# Patient Record
Sex: FEMALE | Race: WHITE | Employment: OTHER | URBAN - METROPOLITAN AREA
[De-identification: names, ages, dates, MRNs, and addresses within clinical notes are randomized per-mention and may not be internally consistent; named-entity substitution may affect disease eponyms.]

---

## 2017-05-09 RX ORDER — TOPIRAMATE 25 MG/1
TABLET ORAL
Qty: 270 TABLET | Refills: 0 | Status: SHIPPED | OUTPATIENT
Start: 2017-05-09 | End: 2017-06-19 | Stop reason: SDUPTHER

## 2017-06-19 ENCOUNTER — OFFICE VISIT (OUTPATIENT)
Dept: NEUROLOGY | Age: 62
End: 2017-06-19
Payer: COMMERCIAL

## 2017-06-19 VITALS
SYSTOLIC BLOOD PRESSURE: 119 MMHG | HEART RATE: 57 BPM | WEIGHT: 138 LBS | DIASTOLIC BLOOD PRESSURE: 70 MMHG | BODY MASS INDEX: 24.45 KG/M2 | HEIGHT: 63 IN

## 2017-06-19 DIAGNOSIS — G43.109 BASILAR MIGRAINE: Primary | ICD-10-CM

## 2017-06-19 PROCEDURE — 99214 OFFICE O/P EST MOD 30 MIN: CPT | Performed by: PSYCHIATRY & NEUROLOGY

## 2017-06-19 RX ORDER — TOPIRAMATE 50 MG/1
50 TABLET, FILM COATED ORAL 2 TIMES DAILY
Qty: 180 TABLET | Refills: 2 | Status: SHIPPED | OUTPATIENT
Start: 2017-06-19 | End: 2018-02-12 | Stop reason: SDUPTHER

## 2017-11-14 ENCOUNTER — OFFICE VISIT (OUTPATIENT)
Dept: NEUROLOGY | Age: 62
End: 2017-11-14
Payer: COMMERCIAL

## 2017-11-14 VITALS
HEIGHT: 63 IN | BODY MASS INDEX: 24.56 KG/M2 | HEART RATE: 73 BPM | DIASTOLIC BLOOD PRESSURE: 60 MMHG | WEIGHT: 138.6 LBS | SYSTOLIC BLOOD PRESSURE: 101 MMHG

## 2017-11-14 DIAGNOSIS — G43.109 BASILAR MIGRAINE: Primary | ICD-10-CM

## 2017-11-14 PROCEDURE — 99213 OFFICE O/P EST LOW 20 MIN: CPT | Performed by: NURSE PRACTITIONER

## 2017-11-14 NOTE — PROGRESS NOTES
[facility]  [facility], [address], [address] [place] [address]  Dept: [phone]  Dept Fax: [phone]                            MD Deja Morin MD Gwendolyn Ferretti, MD Ahmed B. Glennie Main, MD Solon Person, MD Lafayette Little, IVONNE      11/14/2017    HPI:      Your patient, Poly Peterson returns for continuing neurologic care. Patient is a 77-year-old woman seen in the past by [facility] for basilar migraines. Patient has been titrated to Topamax 50 mg twice daily and has done well. She has not been getting any significant headaches however lately she is having increased basilar symptoms. She gets a vertiginous feeling approximately 2-3 times per week. She has been under a significant amount of stress. Her  had a stroke earlier this year as well as an electrical house fire. 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     Past Medical History:   Diagnosis Date    Frozen shoulder     left    Insomnia     Migraine     Osteoarthritis     Osteopenia     Tension headache     Ulcerative colitis ([facility] [address]          Past Surgical History:   Procedure Laterality Date    TONSILLECTOMY      TUBAL LIGATION         Family History   Problem Relation Age of Onset    Cancer Mother      liver, colon    Diabetes Father     High Blood Pressure Father     Neuropathy Father     Heart Disease Father      CHF       Social History   Substance Use Topics    Smoking status: Never Smoker    Smokeless tobacco: Never Used    Alcohol use 0.0 oz/week      Comment: occasionally                               REVIEW OF SYSTEMS    CONSTITUTIONAL Weight: absent, Appetite: absent, Fatigue: absent      HEENT Ears: normal, Visual disturbance: absent   RESPIRATORY Shortness of breath: absent, Cough: absent   CARDIOVASCULAR Chest pain: absent, Leg swelling :absent      GI Constipation: absent, Diarrhea: absent, Swallowing change: absent       Urinary frequency: absent, Urinary urgency: absent, Urinary incontinence: absent   MUSCULOSKELETAL Neck pain: absent, Back pain: absent, Stiffness: absent, Muscle pain: absent, Joint pain: absent Restless legs: absent   DERMATOLOGIC Hair loss: absent, Skin changes: absent   NEUROLOGIC Memory loss: absent, Confusion: absent, Seizures: absent Trouble walking or imbalance: absent, Dizziness: present, Weakness: absent, Numbness: absent Tremor: absent, Spasm: absent, Speech difficulty: absent, Headache: absent, Light sensitivity: absent   PSYCHIATRIC Anxiety: present, Hallucination: absent, Mood disorder: absent   HEMATOLOGIC Abnormal bleeding: absent, Anemia: absent, Clotting disorder: absent, Lymph gland changes: absent           Allergies   Allergen Reactions    Penicillins     Sulfa Antibiotics            Current Outpatient Prescriptions   Medication Sig Dispense Refill    topiramate (TOPAMAX) 50 MG tablet Take 1 tablet by mouth 2 times daily 180 tablet 2    SUMAtriptan (IMITREX) 25 MG tablet Take 1 tab at headache onset, may repeat in 2 hours PRN. No more than 2 tabs/day, 4 tabs/week. 9 tablet 3    butalbital-aspirin-caffeine-codeine (FIORINAL/CODEINE #3) -61-30 MG capsule Take 1 capsule by mouth daily as needed for Migraine 30 capsule 0    Multiple Vitamins-Minerals (MULTIVITAMIN PO) Take by mouth daily      calcium carbonate-vitamin D (CALCIUM 600+D) 600-200 MG-UNIT TABS Take by mouth 2 times daily      aspirin 81 MG EC tablet Take 81 mg by mouth daily.  ibuprofen (ADVIL;MOTRIN) 200 MG CAPS Take 1 capsule by mouth as needed. No current facility-administered medications for this visit. PHYSICAL EXAMINATION       There were no vitals filed for this visit. Veena Denise General Appearance:  Alert, cooperative, no signs of distress, appears stated age   Head:  Normocephalic, no signs of trauma   Eyes:  Conjunctiva/corneas clear;  eyelids intact   Ears:  Normal external ear and canals   Nose: Nares normal, mucosa normal, no drainage    Throat: Lips and tongue normal; teeth normal;  gums normal   Neck: Supple, intact flexion, extension and rotation;   trachea midline;  no adenopathy;   thyroid: not enlarged;   no carotid pulse abnormality   Back:   Symmetric, no curvature, ROM adequate   Lungs:   Respirations unlabored   Heart:  Regular rate and rhythm           Extremities: Extremities normal, no cyanosis, no edema   Pulses: Symmetric over head and neck   Skin: Skin color, texture normal, no rashes, no lesions                                             NEUROLOGIC EXAMINATION    Neurologic Exam     Mental Status   Oriented to person, place, and time. Attention: normal.   Speech: speech is normal   Level of consciousness: alert  Normal comprehension. Cranial Nerves     CN II   Visual fields full to confrontation. CN III, IV, VI   Pupils are equal, round, and reactive to light. Extraocular motions are normal.     CN V   Facial sensation intact. CN VII   Facial expression full, symmetric. CN VIII   CN VIII normal.     CN IX, X   CN IX normal.     CN XI   CN XI normal.     CN XII   CN XII normal.     Motor Exam   Muscle bulk: normal  Overall muscle tone: normal  Right arm pronator drift: absent    Strength   Strength 5/5 throughout.      Sensory Exam   Light touch normal.   Vibration normal.   Pinprick normal.     Gait, Coordination, and Reflexes     Gait  Gait: normal    Coordination   Finger to nose coordination: normal    Tremor   Resting tremor: absent    Reflexes   Right brachioradialis: 2+  Left brachioradialis: 2+  Right biceps: 2+  Left biceps: 2+  Right triceps: 2+  Left triceps: 2+  Right patellar: 2+  Left patellar: 2+  Right achilles: 2+  Left achilles: 2+  Right plantar: normal  Left plantar: normal            ASSESSMENT/PLAN:       In summary, your patient, Alejandro Long exhibits the following, with associated plan:    1. Basilar migraines, have been exacerbated recently with psychological stress. 1. Patient will increase her bedtime Topamax to 75 mg and continue 50 mg in the morning until her stressful job situation subsides. If at that time she is not having any symptoms, she may titrate down to 50 mg twice daily and assess her response to the decreased dosage. 2. We also discussed options for anxiety, which patient will think about, understanding that she would be placed on a long-term medication to prevent anxiety.   3. She will return in follow-up in 6 months            Signed: Jayy Curry CNP

## 2017-11-14 NOTE — LETTER
Comment: occasionally                               REVIEW OF SYSTEMS    CONSTITUTIONAL Weight: absent, Appetite: absent, Fatigue: absent      HEENT Ears: normal, Visual disturbance: absent   RESPIRATORY Shortness of breath: absent, Cough: absent   CARDIOVASCULAR Chest pain: absent, Leg swelling :absent      GI Constipation: absent, Diarrhea: absent, Swallowing change: absent       Urinary frequency: absent, Urinary urgency: absent, Urinary incontinence: absent   MUSCULOSKELETAL Neck pain: absent, Back pain: absent, Stiffness: absent, Muscle pain: absent, Joint pain: absent Restless legs: absent   DERMATOLOGIC Hair loss: absent, Skin changes: absent   NEUROLOGIC Memory loss: absent, Confusion: absent, Seizures: absent Trouble walking or imbalance: absent, Dizziness: present, Weakness: absent, Numbness: absent Tremor: absent, Spasm: absent, Speech difficulty: absent, Headache: absent, Light sensitivity: absent   PSYCHIATRIC Anxiety: present, Hallucination: absent, Mood disorder: absent   HEMATOLOGIC Abnormal bleeding: absent, Anemia: absent, Clotting disorder: absent, Lymph gland changes: absent           Allergies   Allergen Reactions    Penicillins     Sulfa Antibiotics            Current Outpatient Prescriptions   Medication Sig Dispense Refill    topiramate (TOPAMAX) 50 MG tablet Take 1 tablet by mouth 2 times daily 180 tablet 2    SUMAtriptan (IMITREX) 25 MG tablet Take 1 tab at headache onset, may repeat in 2 hours PRN. No more than 2 tabs/day, 4 tabs/week. 9 tablet 3    butalbital-aspirin-caffeine-codeine (FIORINAL/CODEINE #3) -76-30 MG capsule Take 1 capsule by mouth daily as needed for Migraine 30 capsule 0    Multiple Vitamins-Minerals (MULTIVITAMIN PO) Take by mouth daily      calcium carbonate-vitamin D (CALCIUM 600+D) 600-200 MG-UNIT TABS Take by mouth 2 times daily      aspirin 81 MG EC tablet Take 81 mg by mouth daily.  ibuprofen (ADVIL;MOTRIN) 200 MG CAPS Take 1 capsule by mouth as needed. No current facility-administered medications for this visit. PHYSICAL EXAMINATION       There were no vitals filed for this visit. .                                                                                                    General Appearance:  Alert, cooperative, no signs of distress, appears stated age   Head:  Normocephalic, no signs of trauma   Eyes:  Conjunctiva/corneas clear;  eyelids intact   Ears:  Normal external ear and canals   Nose: Nares normal, mucosa normal, no drainage    Throat: Lips and tongue normal; teeth normal;  gums normal   Neck: Supple, intact flexion, extension and rotation;   trachea midline;  no adenopathy;   thyroid: not enlarged;   no carotid pulse abnormality   Back:   Symmetric, no curvature, ROM adequate   Lungs:   Respirations unlabored   Heart:  Regular rate and rhythm           Extremities: Extremities normal, no cyanosis, no edema   Pulses: Symmetric over head and neck   Skin: Skin color, texture normal, no rashes, no lesions                                             NEUROLOGIC EXAMINATION    Neurologic Exam     Mental Status   Oriented to person, place, and time. Attention: normal.   Speech: speech is normal   Level of consciousness: alert  Normal comprehension. Cranial Nerves     CN II   Visual fields full to confrontation. CN III, IV, VI   Pupils are equal, round, and reactive to light. Extraocular motions are normal.     CN V   Facial sensation intact. CN VII   Facial expression full, symmetric. CN VIII   CN VIII normal.     CN IX, X   CN IX normal.     CN XI   CN XI normal.     CN XII   CN XII normal.     Motor Exam   Muscle bulk: normal  Overall muscle tone: normal  Right arm pronator drift: absent    Strength   Strength 5/5 throughout.      Sensory Exam   Light touch normal. Vibration normal.   Pinprick normal.     Gait, Coordination, and Reflexes     Gait  Gait: normal    Coordination   Finger to nose coordination: normal    Tremor   Resting tremor: absent    Reflexes   Right brachioradialis: 2+  Left brachioradialis: 2+  Right biceps: 2+  Left biceps: 2+  Right triceps: 2+  Left triceps: 2+  Right patellar: 2+  Left patellar: 2+  Right achilles: 2+  Left achilles: 2+  Right plantar: normal  Left plantar: normal            ASSESSMENT/PLAN:       In summary, your patient, Pam Bronson exhibits the following, with associated plan:    1. Basilar migraines, have been exacerbated recently with psychological stress. 1. Patient will increase her bedtime Topamax to 75 mg and continue 50 mg in the morning until her stressful job situation subsides. If at that time she is not having any symptoms, she may titrate down to 50 mg twice daily and assess her response to the decreased dosage. 2. We also discussed options for anxiety, which patient will think about, understanding that she would be placed on a long-term medication to prevent anxiety. 3. She will return in follow-up in 6 months            Signed: Adriano Park CNP        If you have questions, please do not hesitate to call me. I look forward to following Cici Simon along with you.     Sincerely,        Shania Nunez CNP

## 2018-02-14 RX ORDER — TOPIRAMATE 50 MG/1
TABLET, FILM COATED ORAL
Qty: 180 TABLET | Refills: 0 | Status: SHIPPED | OUTPATIENT
Start: 2018-02-14 | End: 2018-04-30 | Stop reason: SDUPTHER

## 2018-04-30 RX ORDER — TOPIRAMATE 50 MG/1
TABLET, FILM COATED ORAL
Qty: 180 TABLET | Refills: 0 | Status: SHIPPED | OUTPATIENT
Start: 2018-04-30 | End: 2018-10-04 | Stop reason: SDUPTHER

## 2018-10-04 RX ORDER — TOPIRAMATE 50 MG/1
TABLET, FILM COATED ORAL
Qty: 180 TABLET | Refills: 0 | Status: SHIPPED | OUTPATIENT
Start: 2018-10-04 | End: 2019-03-12 | Stop reason: SDUPTHER

## 2018-11-01 ENCOUNTER — OFFICE VISIT (OUTPATIENT)
Dept: NEUROLOGY | Age: 63
End: 2018-11-01
Payer: MEDICARE

## 2018-11-01 VITALS
WEIGHT: 144.4 LBS | DIASTOLIC BLOOD PRESSURE: 65 MMHG | HEART RATE: 81 BPM | SYSTOLIC BLOOD PRESSURE: 122 MMHG | BODY MASS INDEX: 25.59 KG/M2 | HEIGHT: 63 IN

## 2018-11-01 DIAGNOSIS — R51.9 HEADACHE DISORDER: Primary | ICD-10-CM

## 2018-11-01 PROCEDURE — G8400 PT W/DXA NO RESULTS DOC: HCPCS | Performed by: PSYCHIATRY & NEUROLOGY

## 2018-11-01 PROCEDURE — 1101F PT FALLS ASSESS-DOCD LE1/YR: CPT | Performed by: PSYCHIATRY & NEUROLOGY

## 2018-11-01 PROCEDURE — 99214 OFFICE O/P EST MOD 30 MIN: CPT | Performed by: PSYCHIATRY & NEUROLOGY

## 2018-11-01 PROCEDURE — G8427 DOCREV CUR MEDS BY ELIG CLIN: HCPCS | Performed by: PSYCHIATRY & NEUROLOGY

## 2018-11-01 PROCEDURE — 3017F COLORECTAL CA SCREEN DOC REV: CPT | Performed by: PSYCHIATRY & NEUROLOGY

## 2018-11-01 PROCEDURE — 1090F PRES/ABSN URINE INCON ASSESS: CPT | Performed by: PSYCHIATRY & NEUROLOGY

## 2018-11-01 PROCEDURE — G8419 CALC BMI OUT NRM PARAM NOF/U: HCPCS | Performed by: PSYCHIATRY & NEUROLOGY

## 2018-11-01 PROCEDURE — 1123F ACP DISCUSS/DSCN MKR DOCD: CPT | Performed by: PSYCHIATRY & NEUROLOGY

## 2018-11-01 PROCEDURE — 4040F PNEUMOC VAC/ADMIN/RCVD: CPT | Performed by: PSYCHIATRY & NEUROLOGY

## 2018-11-01 PROCEDURE — G8484 FLU IMMUNIZE NO ADMIN: HCPCS | Performed by: PSYCHIATRY & NEUROLOGY

## 2018-11-01 PROCEDURE — 1036F TOBACCO NON-USER: CPT | Performed by: PSYCHIATRY & NEUROLOGY

## 2018-11-01 NOTE — PROGRESS NOTES
REVIEW OF SYSTEMS    Constitutional Weight: absent, Appetite: absent, Fatigue: absent   HEENT Ears: normal, Eyes: glasses and contacts, Visual disturbance: absent   Reespiratory Shortness of breath: absent, Cough: absent   Cardivascular Chest pain: absent, Leg swelling :absent   GI Constipation: absent, Diarrhea: absent, Swallowing change: absent    Urinary frequency: absent, Urinary urgency: absent, Urinary incontinence: absent   Musculoskeletal Neck pain: absent, Back pain: absent, Stiffness: absent, Muscle pain: absent, Joint pain: absent,    Dermatological Hair loss: absent, Skin changes: absent   Neurological Memory loss:absent , Confusion: absent, Seizures: absent Trouble walking or imbalance: absent, Dizziness: absent, Weakness: absent, Tremor: absent, Spasm: absent, Speech difficulty: absent, Headache: present, Light sensitivity: absent   Psychiatric Anxiety: absent, Hallucination: absent, Mood disorder: absent   Hematologic Abnormal bleeding: absent, Anemia: absent, Clotting disorder: absent, Lymph gland changes: absent
Motor function  Normal muscle bulk and tone; normal power 5/5, including fine motor movements     Sensory function Intact to touch, pin, vibration, proprioception     Cerebellar Intact fine motor movement. No involuntary movements or tremors     Reflex function Intact 2+ DTR and symmetric. Negative Babinski     Gait                  Normal station and gait       Assessment and recommendations:    Basilar Migraines    Patient's MRI scan of the brain in 2012 showed periventricular white matter changes. The patient does not have any risk factor for small muscle disease specifically no diabetes, hypertension or hyperlipidemia. She does not smoke. I would obtain a follow-up MRI scan of the brain for surveillance purposes. Clinically, the patient has been doing fairly well with topiramate 50 mg twice a day with resolution of her headaches and her basilar migraines symptoms. An attempt to taper off the medication previously has resulted in worsening of the symptoms. For now, I will continue the same dose. Medication compliance and side effects was discussed and questions were answered. If she remains stable, she'll follow-up with me in next 1 year. Thank you for the consult. Please contact neurology if there remains any further question about the patient care.

## 2018-11-26 ENCOUNTER — HOSPITAL ENCOUNTER (OUTPATIENT)
Dept: MRI IMAGING | Facility: CLINIC | Age: 63
Discharge: HOME | End: 2018-11-28
Payer: MEDICARE

## 2018-11-26 DIAGNOSIS — R51.9 HEADACHE DISORDER: ICD-10-CM

## 2018-11-26 PROCEDURE — 70551 MRI BRAIN STEM W/O DYE: CPT

## 2019-03-13 RX ORDER — TOPIRAMATE 50 MG/1
TABLET, FILM COATED ORAL
Qty: 180 TABLET | Refills: 1 | Status: SHIPPED | OUTPATIENT
Start: 2019-03-13 | End: 2019-09-10 | Stop reason: SDUPTHER

## 2019-03-13 RX ORDER — TOPIRAMATE 50 MG/1
TABLET, FILM COATED ORAL
Qty: 180 TABLET | Refills: 2 | Status: SHIPPED | OUTPATIENT
Start: 2019-03-13 | End: 2019-09-10 | Stop reason: SDUPTHER

## 2019-09-13 RX ORDER — TOPIRAMATE 50 MG/1
TABLET, FILM COATED ORAL
Qty: 180 TABLET | Refills: 0 | Status: SHIPPED | OUTPATIENT
Start: 2019-09-13 | End: 2019-12-20 | Stop reason: SDUPTHER

## 2019-09-13 RX ORDER — SUMATRIPTAN 25 MG/1
TABLET, FILM COATED ORAL
Qty: 9 TABLET | Refills: 0 | Status: SHIPPED | OUTPATIENT
Start: 2019-09-13 | End: 2020-08-27 | Stop reason: SDUPTHER

## 2019-12-09 ENCOUNTER — TELEPHONE (OUTPATIENT)
Dept: NEUROLOGY | Age: 64
End: 2019-12-09

## 2019-12-17 ENCOUNTER — TELEPHONE (OUTPATIENT)
Dept: NEUROLOGY | Age: 64
End: 2019-12-17

## 2019-12-20 RX ORDER — TOPIRAMATE 50 MG/1
TABLET, FILM COATED ORAL
Qty: 180 TABLET | Refills: 0 | Status: SHIPPED | OUTPATIENT
Start: 2019-12-20 | End: 2020-03-19 | Stop reason: SDUPTHER

## 2020-01-22 ENCOUNTER — OFFICE VISIT (OUTPATIENT)
Dept: NEUROLOGY | Age: 65
End: 2020-01-22
Payer: MEDICARE

## 2020-01-22 VITALS
BODY MASS INDEX: 25.69 KG/M2 | HEART RATE: 66 BPM | SYSTOLIC BLOOD PRESSURE: 119 MMHG | HEIGHT: 63 IN | WEIGHT: 145 LBS | DIASTOLIC BLOOD PRESSURE: 68 MMHG

## 2020-01-22 PROCEDURE — G8484 FLU IMMUNIZE NO ADMIN: HCPCS | Performed by: PSYCHIATRY & NEUROLOGY

## 2020-01-22 PROCEDURE — 1123F ACP DISCUSS/DSCN MKR DOCD: CPT | Performed by: PSYCHIATRY & NEUROLOGY

## 2020-01-22 PROCEDURE — G8400 PT W/DXA NO RESULTS DOC: HCPCS | Performed by: PSYCHIATRY & NEUROLOGY

## 2020-01-22 PROCEDURE — 1090F PRES/ABSN URINE INCON ASSESS: CPT | Performed by: PSYCHIATRY & NEUROLOGY

## 2020-01-22 PROCEDURE — 1036F TOBACCO NON-USER: CPT | Performed by: PSYCHIATRY & NEUROLOGY

## 2020-01-22 PROCEDURE — 3017F COLORECTAL CA SCREEN DOC REV: CPT | Performed by: PSYCHIATRY & NEUROLOGY

## 2020-01-22 PROCEDURE — 99214 OFFICE O/P EST MOD 30 MIN: CPT | Performed by: PSYCHIATRY & NEUROLOGY

## 2020-01-22 PROCEDURE — G8427 DOCREV CUR MEDS BY ELIG CLIN: HCPCS | Performed by: PSYCHIATRY & NEUROLOGY

## 2020-01-22 PROCEDURE — 4040F PNEUMOC VAC/ADMIN/RCVD: CPT | Performed by: PSYCHIATRY & NEUROLOGY

## 2020-01-22 PROCEDURE — G8417 CALC BMI ABV UP PARAM F/U: HCPCS | Performed by: PSYCHIATRY & NEUROLOGY

## 2020-01-22 NOTE — PROGRESS NOTES
[facility]  [facility], [address], [address] [place] [address]  Ph: [phone] or [phone]  FAX: [phone]    Marian James M.D.  Keyonna Bhatt M.D. Jeffrey Siddiqi M.D. Manjit Simmons M.D. Marsha Bella is a 75 y.o. 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 Patient's MRI scan of the brain in 2012 showed periventricular white matter changes. The patient does not have any risk factor for small muscle disease specifically no diabetes, hypertension or hyperlipidemia, or tobacco use. At the last visit on 11/1/2018, the patient was continued on topiramate 50 mg twice a day. An attempt to taper off the medication previously has resulted in worsening of the symptoms. An MRI of the brain was done on 11/26/2018 which was unremarkable. Today, the patient reports continued benefit with topiramate and prefers not to adjust the dose. She denies side effects with the medication including weakness, numbness or loss of vision. She reports one episode of severe headache around November 2019. It awakened her from sleep. Imitrex did not help, and ice pack eventually alleviated the headache. Otherwise, the patient is satisfied with symptom control without lightheadedness. Of note, the patient's states that her daughter underwent PFO closure in November 2019.      Onset of headaches since age 8 years  Initial symptoms: Dizziness, near syncope   Abortive medications: Advil, Imitrex injections, Fiorinal 3  MRI brain January 2012 normal, MRA brain normal              REVIEW OF SYSTEMS     Constitutional Weight: absent, Appetite: absent, Fatigue: absent   HEENT Visual disturbance: absent, Ears: normal   Respiratory Shortness of breath: absent, Cough: absent   Cardiovascular Chest pain: absent, Leg swelling :absent   GI Constipation: absent, Diarrhea: absent, Swallowing change: absent    Urinary frequency: absent, Urinary urgency: absent, Musculoskeletal Neck pain: absent, Back pain: absent, Stiffness: present, Muscle pain: absent, Joint pain: present   Dermatological Hair loss: absent, Skin changes: absent   Neurological Memory loss: absent, Confusion: absent, Seizures: absent, Trouble walking or imbalance: absent, Dizziness: present, Weakness: absent, Numbness: absent Tremor: absent, Spasm: absent, Speech difficulty: absent, Headache: present, Light sensitivity: absent   Psychiatric Anxiety: absent, Hallucination: absent, Depression, absent   Hematologic Abnormal bleeding/Bruising: absent, Anemia: absent       Past Medical History:   Diagnosis Date    Bursitis 2019    had injection of cortisone    Frozen shoulder     left    Insomnia     Migraine     Osteoarthritis     Osteopenia     Tension headache     Ulcerative colitis (HCC)      Past Surgical History:   Procedure Laterality Date    TONSILLECTOMY      TUBAL LIGATION       Social History     Socioeconomic History    Marital status:      Spouse name: Not on file    Number of children: Not on file    Years of education: Not on file    Highest education level: Not on file   Occupational History    Not on file   Social Needs    Financial resource strain: Not on file    Food insecurity:     Worry: Not on file     Inability: Not on file    Transportation needs:     Medical: Not on file     Non-medical: Not on file   Tobacco Use    Smoking status: Never Smoker    Smokeless tobacco: Never Used   Substance and Sexual Activity    Alcohol use:  Yes     Alcohol/week: 0.0 standard drinks     Comment: occasionally    Drug use: No    Sexual activity: Not on file   Lifestyle    Physical activity:     Days per week: Not on file     Minutes per session: Not on file    Stress: Not on file   Relationships    Social connections:     Talks on phone: Not on file     Gets together: Not on file     Attends Adventism service: Not on file     Active member of club or organization: Not on XII - midline tongue without atrophy or fasciculation     Motor function  Normal muscle bulk and tone; normal power 5/5, including fine motor movements     Sensory function Intact to touch, pin, vibration, proprioception     Cerebellar Intact fine motor movement. No involuntary movements or tremors     Reflex function Intact 2+ DTR and symmetric. Negative Babinski     Gait                  Normal station and gait       Assessment and recommendations:    Basilar Migraines  MRI scan of the brain in 2012 showed periventricular white matter changes    Overall the patient reports good symptom control with topiramate 50 mg twice daily, experiencing one migraine episode over the past 14 months. I would like the patient to keep a headache log. At this time, continue topiramate 50 mg twice daily. The patient understands that if symptoms become more frequent, topiramate could be increased. Medication side effects were discussed with the patient and questions were answered. A surveillance MRI of the brain was done on 11/26/2018 which was unremarkable. At this time, I do not see necessity to obtain follow-up MRI scan of the brain unless patient has change in symptoms    The patient was concerned that her headaches may be attributed to blood glucose elevation given her family history of diabetes in her brother. Previously patient had headaches which were unprovoked. My suspicion for hypoglycemia induced headache is low however. I suggested using a glucometer and following up with her primary care physician. If she remains stable, she'll follow-up with me in 6-8 months. Thank you for the consult. Please contact neurology if there remains any further question about the patient care. Scribe Attestation:   By signing my name below, Joann Douglas, attest that this documentation has been prepared under the direction and in the presence of Jackson Haynes MD.     Electronically Signed:  Lila Hoffman. 1/22/20. 10:23

## 2020-01-22 NOTE — PROGRESS NOTES
Appetite: absent, Fatigue: absent   HEENT Visual disturbance: absent, Ears: normal   Respiratory Shortness of breath: absent, Cough: absent   Cardiovascular Chest pain: absent, Leg swelling :absent   GI Constipation: absent, Diarrhea: absent, Swallowing change: absent    Urinary frequency: absent, Urinary urgency: absent,    Musculoskeletal Neck pain: absent, Back pain: absent, Stiffness: present, Muscle pain: absent, Joint pain: present   Dermatological Hair loss: absent, Skin changes: absent   Neurological Memory loss: absent, Confusion: absent, Seizures: absent, Trouble walking or imbalance: absent, Dizziness: present, Weakness: absent, Numbness: absent Tremor: absent, Spasm: absent, Speech difficulty: absent, Headache: present, Light sensitivity: absent   Psychiatric Anxiety: absent, Hallucination: absent, Depression, absent   Hematologic Abnormal bleeding/Bruising: absent, Anemia: absent             Past Medical History:   Diagnosis Date    Frozen shoulder     left    Insomnia     Migraine     Osteoarthritis     Osteopenia     Tension headache     Ulcerative colitis ([facility] [address]      Past Surgical History:   Procedure Laterality Date    TONSILLECTOMY      TUBAL LIGATION       Social History     Socioeconomic History    Marital status:      Spouse name: Not on file    Number of children: Not on file    Years of education: Not on file    Highest education level: Not on file   Occupational History    Not on file   Social Needs    Financial resource strain: Not on file    Food insecurity:     Worry: Not on file     Inability: Not on file    Transportation needs:     Medical: Not on file     Non-medical: Not on file   Tobacco Use    Smoking status: Never Smoker    Smokeless tobacco: Never Used   Substance and Sexual Activity    Alcohol use:  Yes     Alcohol/week: 0.0 standard drinks     Comment: occasionally    Drug use: No    Sexual activity: Not on file   Lifestyle    Physical activity: Days per week: Not on file     Minutes per session: Not on file    Stress: Not on file   Relationships    Social connections:     Talks on phone: Not on file     Gets together: Not on file     Attends Protestant service: Not on file     Active member of club or organization: Not on file     Attends meetings of clubs or organizations: Not on file     Relationship status: Not on file    Intimate partner violence:     Fear of current or ex partner: Not on file     Emotionally abused: Not on file     Physically abused: Not on file     Forced sexual activity: Not on file   Other Topics Concern    Not on file   Social History Narrative    Not on file     Family History   Problem Relation Age of Onset    Cancer Mother         liver, colon    Diabetes Father     High Blood Pressure Father     Neuropathy Father     Heart Disease Father         CHF     Allergies   Allergen Reactions    Penicillins     Sulfa Antibiotics      There were no vitals taken for this visit.         Onset of headaches since age 8 years  Initial symptoms: Dizziness, near syncope   Abortive medications: Advil, Imitrex injections, Fiorinal 3  MRI brain January 2012 normal, MRA brain normal      Neurological examination:    Mental status   Alert and oriented; intact memory with no confusion, speech or language problems; no hallucinations or delusions     Cranial nerves   II - visual fields intact to confrontation                                                III, IV, VI - extra-ocular muscles full: no pupillary defect; no JORGE, no nystagmus, no ptosis   V - normal facial sensation                                                               VII - normal facial symmetry                                                             VIII - intact hearing                                                                             IX, X - symmetrical palate                                                                  XI - symmetrical shoulder

## 2020-01-22 NOTE — PROGRESS NOTES
REVIEW OF SYSTEMS    Constitutional Weight: absent, Appetite: absent, Fatigue: absent   HEENT Visual disturbance: absent, Ears: normal   Respiratory Shortness of breath: absent, Cough: absent   Cardiovascular Chest pain: absent, Leg swelling :absent   GI Constipation: absent, Diarrhea: absent, Swallowing change: absent    Urinary frequency: absent, Urinary urgency: absent,    Musculoskeletal Neck pain: absent, Back pain: absent, Stiffness: present, Muscle pain: absent, Joint pain: present   Dermatological Hair loss: absent, Skin changes: absent   Neurological Memory loss: absent, Confusion: absent, Seizures: absent, Trouble walking or imbalance: absent, Dizziness: present, Weakness: absent, Numbness: absent Tremor: absent, Spasm: absent, Speech difficulty: absent, Headache: present, Light sensitivity: absent   Psychiatric Anxiety: absent, Hallucination: absent, Depression, absent   Hematologic Abnormal bleeding/Bruising: absent, Anemia: absent

## 2020-03-19 NOTE — TELEPHONE ENCOUNTER
Pt called in asking for a refill of her Topamax. She is due, her next appt is scheduled for 7/27/20.

## 2020-03-20 RX ORDER — TOPIRAMATE 50 MG/1
TABLET, FILM COATED ORAL
Qty: 180 TABLET | Refills: 1 | Status: SHIPPED | OUTPATIENT
Start: 2020-03-20 | End: 2020-10-26 | Stop reason: SDUPTHER

## 2020-08-28 RX ORDER — SUMATRIPTAN 25 MG/1
TABLET, FILM COATED ORAL
Qty: 9 TABLET | Refills: 0 | Status: SHIPPED | OUTPATIENT
Start: 2020-08-28 | End: 2021-03-18 | Stop reason: SDUPTHER

## 2020-09-03 ENCOUNTER — OFFICE VISIT (OUTPATIENT)
Dept: NEUROLOGY | Age: 65
End: 2020-09-03
Payer: MEDICARE

## 2020-09-03 VITALS
HEIGHT: 63 IN | HEART RATE: 68 BPM | DIASTOLIC BLOOD PRESSURE: 73 MMHG | BODY MASS INDEX: 25.87 KG/M2 | SYSTOLIC BLOOD PRESSURE: 126 MMHG | WEIGHT: 146 LBS | TEMPERATURE: 98.4 F

## 2020-09-03 PROCEDURE — G8417 CALC BMI ABV UP PARAM F/U: HCPCS | Performed by: PSYCHIATRY & NEUROLOGY

## 2020-09-03 PROCEDURE — 3017F COLORECTAL CA SCREEN DOC REV: CPT | Performed by: PSYCHIATRY & NEUROLOGY

## 2020-09-03 PROCEDURE — 4040F PNEUMOC VAC/ADMIN/RCVD: CPT | Performed by: PSYCHIATRY & NEUROLOGY

## 2020-09-03 PROCEDURE — G8427 DOCREV CUR MEDS BY ELIG CLIN: HCPCS | Performed by: PSYCHIATRY & NEUROLOGY

## 2020-09-03 PROCEDURE — 1036F TOBACCO NON-USER: CPT | Performed by: PSYCHIATRY & NEUROLOGY

## 2020-09-03 PROCEDURE — 1123F ACP DISCUSS/DSCN MKR DOCD: CPT | Performed by: PSYCHIATRY & NEUROLOGY

## 2020-09-03 PROCEDURE — 1090F PRES/ABSN URINE INCON ASSESS: CPT | Performed by: PSYCHIATRY & NEUROLOGY

## 2020-09-03 PROCEDURE — 99213 OFFICE O/P EST LOW 20 MIN: CPT | Performed by: PSYCHIATRY & NEUROLOGY

## 2020-09-03 PROCEDURE — G8400 PT W/DXA NO RESULTS DOC: HCPCS | Performed by: PSYCHIATRY & NEUROLOGY

## 2020-09-03 RX ORDER — LORATADINE 10 MG/1
10 TABLET ORAL DAILY
COMMUNITY

## 2020-09-03 NOTE — PROGRESS NOTES
[facility]  [facility], [address], [address] [place] [address]  Ph: [phone] or [phone]  FAX: [phone]    Bari Acharya M.D.  Umm Hermosillo M.D. Jeffrey Cronin M.D. Miky Bishop M.D. Jung Coronel is a 77 y.o. 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 Patient's MRI scan of the brain in 2012 showed periventricular white matter changes. The patient does not have any risk factor for small muscle disease specifically no diabetes, hypertension or hyperlipidemia, or tobacco use. Today, the patient reports continued benefit with topiramate and has been migraine-free since April 2020. She reports this migraine caused fatigue. She has had headaches 1-2 weeks, exacerbated by the weather change. She describes these as tension headaches. Imitrex resolves the headache, and is only require PRN. She will use Advil in place of Imitrex. She denies side effects with the medication including weakness, numbness or loss of vision, but does affirm some brain fog and word-finding difficulty after using the Topimax. Otherwise, the patient is satisfied with symptom control without lightheadedness. Of note, the patient's states that her daughter underwent PFO closure in November 2019.      Onset of headaches since age 8 years  Initial symptoms: Dizziness, near syncope   Abortive medications: Advil, Imitrex injections, Fiorinal 3  MRI brain January 2012 normal, MRA brain normal 11/2018                            REVIEW OF SYSTEMS    Constitutional Weight: present, Appetite: absent, Fatigue: absent   HEENT Visual disturbance: absent, Ears: normal   Respiratory Shortness of breath: absent, Cough: absent   Cardiovascular Chest pain: absent, Leg swelling :absent   GI Constipation: absent, Diarrhea: absent, Swallowing change: absent    Urinary frequency: absent, Urinary urgency: absent,    Musculoskeletal Neck pain: absent, Back pain: absent, Stiffness: absent, Muscle pain: absent, Joint pain: present   Dermatological Hair loss: absent, Skin changes: absent   Neurological Memory loss: absent, Confusion: absent, Seizures: absent, Trouble walking or imbalance: absent, Dizziness: absent, Weakness: absent, Numbness: absent Tremor: absent, Spasm: absent, Speech difficulty: absent, Headache: present, Light sensitivity: present   Psychiatric Anxiety: absent, Hallucination: absent, Depression, absent   Hematologic Abnormal bleeding/Bruising: absent, Anemia: absent           Past Medical History:   Diagnosis Date    Bursitis 2019    had injection of cortisone    Frozen shoulder     left    Insomnia     Migraine     Osteoarthritis     Osteopenia     Tension headache     Ulcerative colitis ([facility] [address]      Past Surgical History:   Procedure Laterality Date    TONSILLECTOMY      TUBAL LIGATION       Social History     Socioeconomic History    Marital status:      Spouse name: Not on file    Number of children: Not on file    Years of education: Not on file    Highest education level: Not on file   Occupational History    Not on file   Social Needs    Financial resource strain: Not on file    Food insecurity     Worry: Not on file     Inability: Not on file    Transportation needs     Medical: Not on file     Non-medical: Not on file   Tobacco Use    Smoking status: Never Smoker    Smokeless tobacco: Never Used   Substance and Sexual Activity    Alcohol use:  Yes     Alcohol/week: 0.0 standard drinks     Comment: occasionally    Drug use: No    Sexual activity: Not on file   Lifestyle    Physical activity     Days per week: Not on file     Minutes per session: Not on file    Stress: Not on file   Relationships    Social connections     Talks on phone: Not on file     Gets together: Not on file     Attends [employer] service: Not on file     Active member of club or organization: Not on file     Attends meetings of clubs or organizations: Not XII - midline tongue without atrophy or fasciculation     Motor function  Normal muscle bulk and tone; normal power 5/5, including fine motor movements     Sensory function Intact to touch, pin, vibration, proprioception     Cerebellar Intact fine motor movement. No involuntary movements or tremors     Reflex function Intact 2+ DTR and symmetric. Negative Babinski     Gait                  Normal station and gait       Assessment and recommendations:    Basilar Migraines  MRI scan of the brain in 2012 showed periventricular white matter changes    Overall the patient reports good symptom control with topiramate 50 mg twice daily, experiencing one migraine episode since April 2020. At this time, I would like patient to take topiramate 25 mg in the morning and 50 mg at night due to brain fog and wording-finding difficulty side effect. Advised this is a dose-dependent side effect. The patient understands that if symptoms become more frequent, topiramate could be adjusted. Medication side effects were discussed with the patient and questions were answered. A surveillance MRI of the brain was done on 11/26/2018 which was unremarkable. At this time, I do not see necessity to obtain follow-up MRI scan of the brain unless patient has change in symptoms. The patient was concerned that her headaches may be attributed to blood glucose elevation given her family history of diabetes in her brother. Previously patient had headaches which were unprovoked. My suspicion for hypoglycemia induced headache is low however. I suggested using a glucometer and following up with her primary care physician. I told the patient to use Imitrex for her headaches before using Advil due to potential risks from long-term use. If she remains stable, she'll follow-up with me in 6-8 months. Thank you for the consult. Please contact neurology if there remains any further question about the patient care.      Scribe

## 2020-09-03 NOTE — PROGRESS NOTES
REVIEW OF SYSTEMS    Constitutional Weight: present, Appetite: absent, Fatigue: absent   HEENT Visual disturbance: absent, Ears: normal   Respiratory Shortness of breath: absent, Cough: absent   Cardiovascular Chest pain: absent, Leg swelling :absent   GI Constipation: absent, Diarrhea: absent, Swallowing change: absent    Urinary frequency: absent, Urinary urgency: absent,    Musculoskeletal Neck pain: absent, Back pain: absent, Stiffness: absent, Muscle pain: absent, Joint pain: present   Dermatological Hair loss: absent, Skin changes: absent   Neurological Memory loss: absent, Confusion: absent, Seizures: absent, Trouble walking or imbalance: absent, Dizziness: absent, Weakness: absent, Numbness: absent Tremor: absent, Spasm: absent, Speech difficulty: absent, Headache: present, Light sensitivity: present   Psychiatric Anxiety: absent, Hallucination: absent, Depression, absent   Hematologic Abnormal bleeding/Bruising: absent, Anemia: absent

## 2020-09-04 ENCOUNTER — PATIENT MESSAGE (OUTPATIENT)
Dept: NEUROLOGY | Age: 65
End: 2020-09-04

## 2020-09-04 RX ORDER — TOPIRAMATE 50 MG/1
TABLET, FILM COATED ORAL
Qty: 180 TABLET | Refills: 1 | OUTPATIENT
Start: 2020-09-04

## 2020-09-04 NOTE — TELEPHONE ENCOUNTER
From: Elvia Nichols  To: Milton Horowitz MD  Sent: 9/4/2020 8:54 AM EDT  Subject: Prescription Question    On my refill request, Dr. Montgomery Pellet can change to 25mg as the 50mg do not have a line to cut in half. He now wants me to take 25mg in the morning and 50 in the evening. I requested a refill last night. Thank you!

## 2020-09-04 NOTE — TELEPHONE ENCOUNTER
Patient sent a request through [facility] for a refill of Topamax 25 mg tablet. Patient requested this instead of the 50 mg tablet.         Medication active on med list yes      Date of last fill: 3/20/2020  verified on: 9/4/2020  verified by Fabby Read LPN      Date of last appointment: 9/3/2020    Next Visit Date: 03/18/2021

## 2020-09-04 NOTE — TELEPHONE ENCOUNTER
Patient sent a message through [address] today that she wanted Topamax 25 mg tablet instead of the 50 mg tablet.   Denied previous request and will place a new Rx to send for approval.

## 2020-09-07 RX ORDER — TOPIRAMATE 25 MG/1
TABLET ORAL
Qty: 90 TABLET | Refills: 6 | Status: SHIPPED
Start: 2020-09-07 | End: 2021-03-18 | Stop reason: ALTCHOICE

## 2020-09-24 ENCOUNTER — TELEPHONE (OUTPATIENT)
Dept: NEUROLOGY | Age: 65
End: 2020-09-24

## 2020-09-24 NOTE — TELEPHONE ENCOUNTER
Agnieszka Mace called the office this afternoon regarding her Topamax. Patient states that she has tried the lower dose (25 mg q am and 50 mg q hs) for two weeks. Since lowering the dose she has had a headache almost daily, along with 5 migraines. I asked if she thought the brain fog seemed better at the lower dose and she stated that she did think it was better. Patient states that she is going to go back to her original dose. I explained that Dr. Cori Juarez was out of the office this week, but I would send this message on to him. Patient verbally stated her understanding.

## 2020-09-29 NOTE — TELEPHONE ENCOUNTER
Call placed back to Agnieszka Mace and this information was given. Patient will use up her current supply of medication and call the office when she needs a new Rx.

## 2020-10-26 NOTE — TELEPHONE ENCOUNTER
Pharmacy requesting refill of topiramate 50 mg      Medication active on med list yes      Date of last Rx: 3/20/2020  with 1 refills   verified on 10/26/2020   verified by TAMEKA BEYER      Date of last appointment 9/3/2020    Next Visit Date:  3/18/2021

## 2020-10-26 NOTE — TELEPHONE ENCOUNTER
Anna Philip called and would like to go back to the dose of topiramate of 50 mg in the morning and 50 mg in the evening. Sent refill request of the 50 mg BID to the clinical pool.

## 2020-10-28 RX ORDER — TOPIRAMATE 50 MG/1
TABLET, FILM COATED ORAL
Qty: 180 TABLET | Refills: 0 | Status: SHIPPED | OUTPATIENT
Start: 2020-10-28 | End: 2021-01-26 | Stop reason: SDUPTHER

## 2021-01-25 NOTE — TELEPHONE ENCOUNTER
Patient calling for refill of Topamax.       Medication active on med list yes      Date of last fill: 10/28/20  verified on 1/25/2021   verified by [facility] LPN      Date of last appointment 9/3/20    Next Visit Date:  3/18/2021

## 2021-01-26 RX ORDER — TOPIRAMATE 50 MG/1
TABLET, FILM COATED ORAL
Qty: 180 TABLET | Refills: 0 | Status: SHIPPED | OUTPATIENT
Start: 2021-01-26 | End: 2021-04-26

## 2021-03-18 ENCOUNTER — OFFICE VISIT (OUTPATIENT)
Dept: NEUROLOGY | Age: 66
End: 2021-03-18
Payer: MEDICARE

## 2021-03-18 VITALS
DIASTOLIC BLOOD PRESSURE: 65 MMHG | SYSTOLIC BLOOD PRESSURE: 121 MMHG | TEMPERATURE: 98.2 F | WEIGHT: 143.8 LBS | BODY MASS INDEX: 25.48 KG/M2 | HEART RATE: 78 BPM | HEIGHT: 63 IN

## 2021-03-18 DIAGNOSIS — G43.711 CHRONIC MIGRAINE WITHOUT AURA, WITH INTRACTABLE MIGRAINE, SO STATED, WITH STATUS MIGRAINOSUS: Primary | ICD-10-CM

## 2021-03-18 PROCEDURE — 4040F PNEUMOC VAC/ADMIN/RCVD: CPT | Performed by: PSYCHIATRY & NEUROLOGY

## 2021-03-18 PROCEDURE — G8427 DOCREV CUR MEDS BY ELIG CLIN: HCPCS | Performed by: PSYCHIATRY & NEUROLOGY

## 2021-03-18 PROCEDURE — 99214 OFFICE O/P EST MOD 30 MIN: CPT | Performed by: PSYCHIATRY & NEUROLOGY

## 2021-03-18 PROCEDURE — G8417 CALC BMI ABV UP PARAM F/U: HCPCS | Performed by: PSYCHIATRY & NEUROLOGY

## 2021-03-18 PROCEDURE — 1036F TOBACCO NON-USER: CPT | Performed by: PSYCHIATRY & NEUROLOGY

## 2021-03-18 PROCEDURE — G8400 PT W/DXA NO RESULTS DOC: HCPCS | Performed by: PSYCHIATRY & NEUROLOGY

## 2021-03-18 PROCEDURE — 1090F PRES/ABSN URINE INCON ASSESS: CPT | Performed by: PSYCHIATRY & NEUROLOGY

## 2021-03-18 PROCEDURE — 1123F ACP DISCUSS/DSCN MKR DOCD: CPT | Performed by: PSYCHIATRY & NEUROLOGY

## 2021-03-18 PROCEDURE — G8484 FLU IMMUNIZE NO ADMIN: HCPCS | Performed by: PSYCHIATRY & NEUROLOGY

## 2021-03-18 PROCEDURE — 3017F COLORECTAL CA SCREEN DOC REV: CPT | Performed by: PSYCHIATRY & NEUROLOGY

## 2021-03-18 RX ORDER — SUMATRIPTAN 50 MG/1
50 TABLET, FILM COATED ORAL
Qty: 9 TABLET | Refills: 2 | Status: SHIPPED | OUTPATIENT
Start: 2021-03-18 | End: 2021-12-06

## 2021-03-18 NOTE — PROGRESS NOTES
[facility]  [facility], [address] [place] [address], [address] [place] [address]  Ph: [phone] or [phone]  FAX: [phone]          Kaylie Montano is a 77 y.o. 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 Patient's MRI scan of the brain in 2012 showed periventricular white matter changes. The patient does not have any risk factor for small muscle disease specifically no diabetes, hypertension or hyperlipidemia, or tobacco use. Today, the patient reports continued benefit with topiramate and has been migraine-free since April 2020. She reports this migraine caused fatigue. She has had headaches 1-2 weeks, exacerbated by the weather change. She describes these as tension headaches. Imitrex resolves the headache, and is only require PRN. She will use Advil in place of Imitrex. She denies side effects with the medication including weakness, numbness or loss of vision, but does affirm some brain fog and word-finding difficulty after using the Topomax. Otherwise, the patient is satisfied with symptom control without lightheadedness. Today, the patient admits to medication compliance. She reports success with Topomax 50 mg twice daily, but 25 mg twice daily was ineffective. She has begun working on puzzles and reading which is helping her forgetfulness. She admits to a headache following the second Covid-19 vaccine. She reports sleep disturbance and takes Benadryl intermittently. Of note, the patient's states that her daughter underwent PFO closure in November 2019.      Onset of headaches since age 8 years  Initial symptoms: Dizziness, near syncope   Abortive medications: Advil, Imitrex injections, Fiorinal 3  MRI brain January 2012 normal, MRA brain normal 11/2018                            REVIEW OF SYSTEMS    Constitutional Weight: present, Appetite: absent, Fatigue: absent   HEENT Visual disturbance: absent, Ears: normal   Respiratory Shortness of breath: absent, Cough: Relationships    Social connections     Talks on phone: Not on file     Gets together: Not on file     Attends Z[employer]astrian service: Not on file     Active member of club or organization: Not on file     Attends meetings of clubs or organizations: Not on file     Relationship status: Not on file    Intimate partner violence     Fear of current or ex partner: Not on file     Emotionally abused: Not on file     Physically abused: Not on file     Forced sexual activity: Not on file   Other Topics Concern    Not on file   Social History Narrative    Not on file     Family History   Problem Relation Age of Onset    Cancer Mother         liver, colon    Diabetes Father     High Blood Pressure Father     Neuropathy Father     Heart Disease Father         CHF     Allergies   Allergen Reactions    Penicillins     Sulfa Antibiotics      /65 (Site: Left Upper Arm, Position: Sitting)   Pulse 78   Temp 98.2 °F (36.8 °C)   Ht 5' 3\" (1.6 m)   Wt 143 lb 12.8 oz (65.2 kg)   BMI 25.47 kg/m²      General examination:    Head: Normocephalic, atraumatic  Eyes: Extraocular movements intact  Lungs: Respirations unlabored, chest wall no deformity  ENT: Normal external ear canals, no sinus tenderness  Heart: Regular rate rhythm  Abdomen: No masses, tenderness  Extremities: No cyanosis or edema, 2+ pulses  Skin: Intact, normal skin color    Neurological examination:    Mental status   Alert and oriented; intact memory with no confusion, speech or language problems; no hallucinations or delusions     Cranial nerves   II - visual fields intact to confrontation                                                III, IV, VI  extra-ocular muscles full: no pupillary defect; no JORGE, no nystagmus, no ptosis   V - normal facial sensation                                                               VII - normal facial symmetry                                                             VIII - intact hearing IX, X - symmetrical palate                                                                  XI - symmetrical shoulder shrug                                                       XII - midline tongue without atrophy or fasciculation     Motor function  Normal muscle bulk and tone; normal power 5/5, including fine motor movements     Sensory function Intact to touch, pin, vibration, proprioception     Cerebellar Intact fine motor movement. No involuntary movements or tremors     Reflex function Intact 2+ DTR and symmetric. Negative Babinski     Gait                  Normal station and gait       Assessment and recommendations:    Basilar Migraines  MRI scan of the brain in 2012 showed periventricular white matter changes    Overall the patient reports good symptom control with topiramate 50 mg twice daily. Patient had less success with Topiramate 25 mg, so continue topiramate 50 mg twice daily. The patient understands that if symptoms become more frequent, topiramate could be adjusted. Patient exhibits sleep disturbance intermittently. I recommend the patient take Melatonin for aid with sleeping, up to 10 mg. Medication side effects were discussed with the patient and questions were answered. A surveillance MRI of the brain was done on 11/26/2018 which was unremarkable. At this time, I do not see necessity to obtain follow-up MRI scan of the brain unless patient has change in symptoms. The patient was concerned that her headaches may be attributed to blood glucose elevation given her family history of diabetes in her brother. Previously, patient had headaches which were unprovoked. My suspicion for hypoglycemia induced headache is low however. I suggested using a glucometer and following up with her primary care physician. I told the patient to use Advil for her headaches before using Imitrex due to potential risks from long-term use.  Continue Imitrex 50 mg as needed, no more than 100 mg in a day or 200 mg in a week. If she remains stable, she'll follow-up with me in 6-8 months. Thank you for the consult. Please contact neurology if there remains any further question about the patient care. Scribe Attestation:   By signing my name below, Fred Up, attest that this documentation has been prepared under the direction and in the presence of Annia Toure MD.    Electronically Signed: Wiley Bucio. 3/18/2021 10:09 AM    Physician Attestation:   Gwendel Schaumann MD, personally performed the services described in this documentation. All medical record entries made by the scribe were at my direction and in my presence. I have reviewed the chart and discharge instructions (if applicable) and agree that the record reflects my personal performance and is accurate and complete.     Electronically Signed: Gaston Mcgarry 3/18/2021 10:09 AM    Diplomate, American Board of Psychiatry and Neurology  Diplomate, American Board of Clinical Neurophysiology  Diplomate, American Board of Epilepsy

## 2021-04-26 RX ORDER — TOPIRAMATE 50 MG/1
TABLET, FILM COATED ORAL
Qty: 180 TABLET | Refills: 1 | Status: SHIPPED | OUTPATIENT
Start: 2021-04-26 | End: 2021-09-20 | Stop reason: SDUPTHER

## 2021-04-26 NOTE — TELEPHONE ENCOUNTER
Pharmacy requesting refill of Topamax.       Medication active on med list: yes      Date of last fill: 1/26/21 for #[id number] NR  verified on 4/26/2021    verified by Ольга Choi LPN      Date of last appointment: 3/18/2021    Next Visit Date:  9/20/2021

## 2021-09-20 ENCOUNTER — OFFICE VISIT (OUTPATIENT)
Dept: NEUROLOGY | Age: 66
End: 2021-09-20
Payer: MEDICARE

## 2021-09-20 VITALS
HEIGHT: 63 IN | BODY MASS INDEX: 25.55 KG/M2 | SYSTOLIC BLOOD PRESSURE: 100 MMHG | WEIGHT: 144.2 LBS | DIASTOLIC BLOOD PRESSURE: 59 MMHG | HEART RATE: 67 BPM

## 2021-09-20 DIAGNOSIS — G43.109 BASILAR MIGRAINE: Primary | ICD-10-CM

## 2021-09-20 PROCEDURE — G8427 DOCREV CUR MEDS BY ELIG CLIN: HCPCS | Performed by: PSYCHIATRY & NEUROLOGY

## 2021-09-20 PROCEDURE — 4040F PNEUMOC VAC/ADMIN/RCVD: CPT | Performed by: PSYCHIATRY & NEUROLOGY

## 2021-09-20 PROCEDURE — 1036F TOBACCO NON-USER: CPT | Performed by: PSYCHIATRY & NEUROLOGY

## 2021-09-20 PROCEDURE — 3017F COLORECTAL CA SCREEN DOC REV: CPT | Performed by: PSYCHIATRY & NEUROLOGY

## 2021-09-20 PROCEDURE — 1123F ACP DISCUSS/DSCN MKR DOCD: CPT | Performed by: PSYCHIATRY & NEUROLOGY

## 2021-09-20 PROCEDURE — G8417 CALC BMI ABV UP PARAM F/U: HCPCS | Performed by: PSYCHIATRY & NEUROLOGY

## 2021-09-20 PROCEDURE — 99214 OFFICE O/P EST MOD 30 MIN: CPT | Performed by: PSYCHIATRY & NEUROLOGY

## 2021-09-20 PROCEDURE — 1090F PRES/ABSN URINE INCON ASSESS: CPT | Performed by: PSYCHIATRY & NEUROLOGY

## 2021-09-20 PROCEDURE — G8400 PT W/DXA NO RESULTS DOC: HCPCS | Performed by: PSYCHIATRY & NEUROLOGY

## 2021-09-20 RX ORDER — TOPIRAMATE 50 MG/1
TABLET, FILM COATED ORAL
Qty: 180 TABLET | Refills: 3 | Status: SHIPPED | OUTPATIENT
Start: 2021-09-20 | End: 2022-08-12

## 2021-09-20 RX ORDER — PREDNISONE 20 MG/1
TABLET ORAL
Qty: 18 TABLET | Refills: 0 | Status: SHIPPED | OUTPATIENT
Start: 2021-09-20

## 2021-09-20 NOTE — PROGRESS NOTES
[facility]  JEFRY Metcalf [address], [address] [facility]  Ph: [phone] or [phone]  FAX: [phone]          Bryon Cheung is a 74 y.o. 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 Patient's MRI scan of the brain in 2012 showed periventricular white matter changes. The patient does not have any risk factor for small muscle disease specifically no diabetes, hypertension or hyperlipidemia, or tobacco use. Today, the patient reports continued benefit with topiramate and has been migraine-free since April 2020. She reports this migraine caused fatigue. She has had headaches 1-2 weeks, exacerbated by the weather change. She describes these as tension headaches. Imitrex resolves the headache, and is only require PRN. She will use Advil in place of Imitrex. She denies side effects with the medication including weakness, numbness or loss of vision, but does affirm some brain fog and word-finding difficulty after using the Topomax. Otherwise, the patient is satisfied with symptom control without lightheadedness. Reported success with Topomax 50 mg twice daily, but 25 mg twice daily was ineffective. She has begun working on puzzles and reading which is helping her forgetfulness. She admits to a headache following the second Covid-19 vaccine. She reports sleep disturbance and takes Benadryl intermittently. Today, patient reports she experienced a headache on Friday which lead to numbness in the right side of her head. Patient admits to having taking Imitrex with minimal benefit. She states headaches increased in frequency starting early this month. Patient has not noticed headaches are provoked with foods. Denies vision changes, pain in temples. Admits to flashing in eyes which is not associated with recent increase in headache occurrence. Patient admits to Benadryl and melatonin use on a nightly basis to sleep better.      Of note, the patient's states that her daughter underwent PFO closure in November 2019.      Onset of headaches since age 8 years  Initial symptoms: Dizziness, near syncope   Abortive medications: Advil, Imitrex injections, Fiorinal 3  MRI brain January 2012 normal, MRA brain normal 11/2018                            REVIEW OF SYSTEMS    Constitutional Weight: present, Appetite: absent, Fatigue: absent   HEENT Visual disturbance: absent, Ears: normal   Respiratory Shortness of breath: absent, Cough: absent   Cardiovascular Chest pain: absent, Leg swelling :absent   GI Constipation: absent, Diarrhea: absent, Swallowing change: absent    Urinary frequency: absent, Urinary urgency: absent,    Musculoskeletal Neck pain: absent, Back pain: absent, Stiffness: absent, Muscle pain: absent, Joint pain: present   Dermatological Hair loss: absent, Skin changes: absent   Neurological Memory loss: absent, Confusion: absent, Seizures: absent, Trouble walking or imbalance: absent, Dizziness: absent, Weakness: absent, Numbness: absent Tremor: absent, Spasm: absent, Speech difficulty: absent, Headache: present, Light sensitivity: present   Psychiatric Anxiety: absent, Hallucination: absent, Depression, absent   Hematologic Abnormal bleeding/Bruising: absent, Anemia: absent           Past Medical History:   Diagnosis Date    Bursitis 2019    had injection of cortisone    Frozen shoulder     left    Insomnia     Migraine     Osteoarthritis     Osteopenia     Tension headache     Ulcerative colitis ([facility] [address]      Past Surgical History:   Procedure Laterality Date    TONSILLECTOMY      TUBAL LIGATION       Social History     Socioeconomic History    Marital status:      Spouse name: Not on file    Number of children: Not on file    Years of education: Not on file    Highest education level: Not on file   Occupational History    Not on file   Tobacco Use    Smoking status: Never Smoker    Smokeless tobacco: Never Used   Vaping Use    Vaping Use: Never used   Substance and Sexual Activity    Alcohol use: Yes     Alcohol/week: 0.0 standard drinks     Comment: occasionally    Drug use: No    Sexual activity: Not on file   Other Topics Concern    Not on file   Social History Narrative    Not on file     Social Determinants of Health     Financial Resource Strain:     Difficulty of Paying Living Expenses:    Food Insecurity:     Worried About Running Out of Food in the Last Year:     [address] in the Last Year:    Transportation Needs:     Lack of Transportation (Medical):      Lack of Transportation (Non-Medical):    Physical Activity:     Days of Exercise per Week:     Minutes of Exercise per Session:    Stress:     Feeling of Stress :    Social Connections:     Frequency of Communication with Friends and Family:     Frequency of Social Gatherings with Friends and Family:     Attends Zoroastrianism Services:     Active Member of Clubs or Organizations:     Attends Club or Organization Meetings:     Marital Status:    Intimate Partner Violence:     Fear of Current or Ex-Partner:     Emotionally Abused:     Physically Abused:     Sexually Abused:      Family History   Problem Relation Age of Onset    Cancer Mother         liver, colon    Diabetes Father     High Blood Pressure Father     Neuropathy Father     Heart Disease Father         CHF     Allergies   Allergen Reactions    Penicillins     Sulfa Antibiotics      BP (!) 100/59 (Site: Left Upper Arm, Position: Sitting, Cuff Size: Medium Adult)   Pulse 67   Ht 5' 3\" (1.6 m)   Wt 144 lb 3.2 oz (65.4 kg)   BMI 25.54 kg/m²      General examination:    Head: Normocephalic, atraumatic  Eyes: Extraocular movements intact  Lungs: Respirations unlabored, chest wall no deformity  ENT: Normal external ear canals, no sinus tenderness  Heart: Regular rate rhythm  Abdomen: No masses, tenderness  Extremities: No cyanosis or edema, 2+ pulses  Skin: Intact, normal skin color    Neurological examination:    Mental status   Alert and oriented; intact memory with no confusion, speech or language problems; no hallucinations or delusions     Cranial nerves   II - visual fields intact to confrontation                                                III, IV, VI - extra-ocular muscles full: no pupillary defect; no JORGE, no nystagmus, no ptosis   V - normal facial sensation                                                               VII - normal facial symmetry                                                             VIII - intact hearing                                                                             IX, X - symmetrical palate                                                                  XI - symmetrical shoulder shrug                                                       XII - midline tongue without atrophy or fasciculation     Motor function  Normal muscle bulk and tone; normal power 5/5, including fine motor movements     Sensory function Intact to touch, pin, vibration, proprioception     Cerebellar Intact fine motor movement. No involuntary movements or tremors     Reflex function Intact 2+ DTR and symmetric. Negative Babinski     Gait                  Normal station and gait       Assessment and recommendations:    Basilar Migraines  MRI scan of the brain in 2012 showed periventricular white matter changes    The patient reports in last 1 to 2 months, she has more frequent breakthrough headaches. She reports more consistent headache in last 1 week. I will increase the maintenance dose of topiramate to 50 mg +75 mg a day    As an abortive treatment, I will give her prednisone 20 mg, take 3 tab po qd x 3 days, 2 tab po qd x 3 days, 1 tab po qd x 3 days. If unable to control headaches with prednisone burst, I discussed initiating IV medication as a last resort. Patient is to follow up with me in 2-3 months or sooner if symptoms worsen. Humberto Willis MD, thank you for the consult. Please contact neurology if there remains any further question about the patient care. Scribe Attestation:   By signing my name below, I, Deandra Estrada, attest that this documentation has been prepared under the direction and in the presence of Claire Fung MD.    Electronically Signed: Deandra Estrada. 9/20/2021 11:16 AM    Physician Attestation:   Anish Freeman MD, personally performed the services described in this documentation. All medical record entries made by the scribe were at my direction and in my presence. I have reviewed the chart and discharge instructions (if applicable) and agree that the record reflects my personal performance and is accurate and complete.     Electronically Signed: Fredy Camarillo 9/20/2021 11:16 AM    Diplomate, American Board of Psychiatry and Neurology  Diplomate, American Board of Clinical Neurophysiology  Diplomate, American Board of Epilepsy

## 2021-12-06 ENCOUNTER — OFFICE VISIT (OUTPATIENT)
Dept: NEUROLOGY | Age: 66
End: 2021-12-06
Payer: MEDICARE

## 2021-12-06 VITALS
WEIGHT: 134 LBS | BODY MASS INDEX: 23.74 KG/M2 | SYSTOLIC BLOOD PRESSURE: 117 MMHG | HEART RATE: 69 BPM | DIASTOLIC BLOOD PRESSURE: 68 MMHG | HEIGHT: 63 IN | TEMPERATURE: 97.9 F

## 2021-12-06 DIAGNOSIS — G43.109 BASILAR MIGRAINE: Primary | ICD-10-CM

## 2021-12-06 PROCEDURE — 3017F COLORECTAL CA SCREEN DOC REV: CPT | Performed by: PSYCHIATRY & NEUROLOGY

## 2021-12-06 PROCEDURE — 99213 OFFICE O/P EST LOW 20 MIN: CPT | Performed by: PSYCHIATRY & NEUROLOGY

## 2021-12-06 PROCEDURE — G8427 DOCREV CUR MEDS BY ELIG CLIN: HCPCS | Performed by: PSYCHIATRY & NEUROLOGY

## 2021-12-06 PROCEDURE — G8400 PT W/DXA NO RESULTS DOC: HCPCS | Performed by: PSYCHIATRY & NEUROLOGY

## 2021-12-06 PROCEDURE — 4040F PNEUMOC VAC/ADMIN/RCVD: CPT | Performed by: PSYCHIATRY & NEUROLOGY

## 2021-12-06 PROCEDURE — G8420 CALC BMI NORM PARAMETERS: HCPCS | Performed by: PSYCHIATRY & NEUROLOGY

## 2021-12-06 PROCEDURE — 1090F PRES/ABSN URINE INCON ASSESS: CPT | Performed by: PSYCHIATRY & NEUROLOGY

## 2021-12-06 PROCEDURE — 1036F TOBACCO NON-USER: CPT | Performed by: PSYCHIATRY & NEUROLOGY

## 2021-12-06 PROCEDURE — G8484 FLU IMMUNIZE NO ADMIN: HCPCS | Performed by: PSYCHIATRY & NEUROLOGY

## 2021-12-06 PROCEDURE — 1123F ACP DISCUSS/DSCN MKR DOCD: CPT | Performed by: PSYCHIATRY & NEUROLOGY

## 2021-12-06 NOTE — PROGRESS NOTES
[facility], [address], [address] [place] [address]  Ph: [phone] or [phone]  FAX: [phone]    Chief Complaint: headaches     Dear Mandi Avila MD     I had the pleasure of seeing your patient today in neurology consultation for her symptoms. As you would recall Nic Velazquez is a 74 y.o. 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 Patient's MRI scan of the brain in 2012 showed periventricular white matter changes. The patient does not have any risk factor for small muscle disease specifically no diabetes, hypertension or hyperlipidemia, or tobacco use. Today, the patient reports continued benefit with topiramate and has been migraine-free since April 2020. She reports this migraine caused fatigue. She has had headaches 1-2 weeks, exacerbated by the weather change. She describes these as tension headaches. Imitrex resolves the headache, and is only require PRN. She will use Advil in place of Imitrex. She denies side effects with the medication including weakness, numbness or loss of vision, but does affirm some brain fog and word-finding difficulty after using the Topamax. Otherwise, the patient is satisfied with symptom control without lightheadedness. Reported success with Topamax 50 mg twice daily, but 25 mg twice daily was ineffective. She has begun working on puzzles and reading which is helping her forgetfulness. She admits to a headache following the second Covid-19 vaccine. She reports sleep disturbance and takes Benadryl intermittently. During visit on 9/20/2021, the patient reported experiencing a headache on Friday which lead to numbness in the right side of her head. Patient admits to having taking Imitrex with minimal benefit. She states headaches increased in frequency starting early this month. Patient has not noticed headaches are provoked with foods. Denies vision changes, pain in temples.  Admits to flashing in eyes which is not associated with recent increase in headache occurrence. Patient admits to Benadryl and melatonin use on a nightly basis to sleep better. Increased Topamax 50 mg BID to topiramate to 50 mg +75 mg a day. Patient was initiated on prednisone 20 mg, take 3 tab po qd x 3 days, 2 tab po qd x 3 days, 1 tab po qd x 3 days. Today, the patient admits to medication compliance with Topamax 50 mg +75 mg a day. Denies side effects of the medication. Patient reports she has experienced hardly any headaches since having Topamax dose increased. Patient reports she has recently started cutting out processed foods from her diet and feels this change, in addition to current medication, has helped control her headaches better. Patient reports dizziness has subsided for the most part but she continues to experience slight, intermittent dizziness. Of note, the patient's states that her daughter underwent PFO closure in November 2019.      Onset of headaches since age 8 years  Initial symptoms: Dizziness, near syncope   Abortive medications: Advil, Imitrex injections, Fiorinal 3  MRI brain January 2012 normal, MRA brain normal 11/2018    Past Medical History:   Diagnosis Date    Bursitis 2019    had injection of cortisone    Frozen shoulder     left    Insomnia     Migraine     Osteoarthritis     Osteopenia     Tension headache     Ulcerative colitis ([facility] [address]      Past Surgical History:   Procedure Laterality Date    TONSILLECTOMY      TUBAL LIGATION       Allergies   Allergen Reactions    Penicillins     Sulfa Antibiotics      Family History   Problem Relation Age of Onset    Cancer Mother         liver, colon    Diabetes Father     High Blood Pressure Father     Neuropathy Father     Heart Disease Father         CHF      Social History     Socioeconomic History    Marital status:      Spouse name: Not on file    Number of children: Not on file    Years of education: Not on file    Highest education level: Not on file   Occupational History    Not on file   Tobacco Use    Smoking status: Never Smoker    Smokeless tobacco: Never Used   Vaping Use    Vaping Use: Never used   Substance and Sexual Activity    Alcohol use: Yes     Alcohol/week: 0.0 standard drinks     Comment: occasionally    Drug use: No    Sexual activity: Not on file   Other Topics Concern    Not on file   Social History Narrative    Not on file     Social Determinants of Health     Financial Resource Strain:     Difficulty of Paying Living Expenses: Not on file   Food Insecurity:     Worried About Running Out of Food in the Last Year: Not on file    [place] of Food in the Last Year: Not on file   Transportation Needs:     Lack of Transportation (Medical): Not on file    Lack of Transportation (Non-Medical): Not on file   Physical Activity:     Days of Exercise per Week: Not on file    Minutes of Exercise per Session: Not on file   Stress:     Feeling of Stress : Not on file   Social Connections:     Frequency of Communication with Friends and Family: Not on file    Frequency of Social Gatherings with Friends and Family: Not on file    Attends Judaism Services: Not on file    Active Member of [address] [place] [address] or Organizations: Not on file    Attends Club or Organization Meetings: Not on file    Marital Status: Not on file   Intimate Partner Violence:     Fear of Current or Ex-Partner: Not on file    Emotionally Abused: Not on file    Physically Abused: Not on file    Sexually Abused: Not on file   Housing Stability:     Unable to Pay for Housing in the Last Year: Not on file    Number of [place] in the Last Year: Not on file    Unstable Housing in the Last Year: Not on file      There were no vitals taken for this visit.       HEENT [] Hearing Loss  [] Visual Disturbance  [] Tinnitus  [] Eye pain   Respiratory [] Shortness of Breath  [] Cough  [] Snoring   Cardiovascular [] Chest Pain  [] Palpitations  [] Lightheaded   GI [] Constipation  [] Diarrhea  [] Swallowing change  [] Nausea/vomiting    [] Urinary Frequency  [] Urinary Urgency   Musculoskeletal [] Neck pain  [] Back pain  [] Muscle pain  [] Restless legs   Dermatologic [] Skin changes   Neurologic [] Memory loss/confusion  [] Seizures  [] Trouble walking or imbalance  [x] Dizziness  [] Sleep disturbance  [] Weakness  [] Numbness  [] Tremors  [] Speech Difficulty  [x] Headaches  [] Light Sensitivity  [] Sound Sensitivity   Endocrinology []Excessive thirst  []Excessive hunger   Psychiatric [] Anxiety/Depression  [] Hallucination   Allergy/immunology []Hives/environmental allergies   Hematologic/lymph [] Abnormal bleeding  [] Abnormal bruising     General appearence: Normal. Well groomed.  In no acute distress    Head: Normocephalic, atraumatic  Eyes: Extraocular movements intact, eye lids normal  Lungs: Respirations unlabored, chest wall no deformity  ENT: Normal external ear canals, no sinus tenderness  Heart: Regular rate rhythm  Abdomen: No masses, tenderness  Extremities: No cyanosis or edema, 2+ pulses  Musculoskeletal: Normal range of motion in all joints  Skin: Intact, normal skin color    Neurological examination:    Mental status   Alert and oriented; intact memory with no confusion, speech or language problems; no hallucinations or delusions     Cranial nerves   II - visual fields intact to confrontation                                                III, IV, VI - extra-ocular muscles full: no pupillary defect; no JORGE, no nystagmus, no ptosis   V - normal facial sensation                                                               VII - normal facial symmetry                                                             VIII - intact hearing                                                                             IX, X - symmetrical palate                                                                  XI - symmetrical shoulder shrug XII - midline tongue without atrophy or fasciculation     Motor function  Normal muscle bulk and tone; normal power 5/5, including fine motor movements     Sensory function Intact to touch, pin, vibration, proprioception     Cerebellar Intact fine motor movement. No involuntary movements or tremors     Reflex function Intact 2+ DTR and symmetric. Negative Babinski     Gait                  Normal station and gait       No results found for: LDLCALC, LDLCHOLESTEROL, LDLDIRECT  No components found for: CHLPL  No results found for: TRIG  No results found for: HDL  No results found for: LDLCALC  No results found for: LABVLDL  No results found for: LABA1C  No results found for: EAG  No results found for: [id number]     Neurological work up:  CT head  CTA head and neck  MRI brain   2 D echo     All of patient's labs were personally reviewed. All the imaging studies were personally reviewed and discussed with the patient. Assessment Recommendations:  Basilar migraines  MRI scan of the brain in 2012 showed periventricular white matter changes    The patient reports significant relief of symptoms with topiramate 50 mg +75 mg a day. I recommend the patient continue current treatment plan for continued alleviation of symptoms. Follow up in 1 year or sooner if symptoms worsen. Augustus Hurtado MD I would like to thank you for the consult. Please do not hesitate if you have any questions about the patient care. Scribe Attestation:   By signing my name below, I, Jose Alfredo Hurtado, attest that this documentation has been prepared under the direction and in the presence of Steven Hooker MD.      Electronically Signed: Jose Alfredo Hurtado. 12/6/2021 8:44 AM      Physician Attestation:   Pietro Salgado MD, personally performed the services described in this documentation. All medical record entries made by the scribe were at my direction and in my presence.  I have reviewed the chart and discharge instructions (if applicable) and agree that the record reflects my personal performance and is accurate and complete. Electronically Signed: Rohan Silverio 12/6/2021 8:44 AM    Diplomate, American Board of Psychiatry and Neurology  Diplomate, American Board of Clinical Neurophysiology  Diplomate, American Board of Epilepsy     This note is created with the assistance of a speech-recognition program. While intending to generate a document that actually reflects the content of the visit, the document can still have some errors including those of syntax and sound a- like substitutions which may escape proofreading. In such instances, actual meaning can be extrapolated by contextual derivation.

## 2022-08-09 NOTE — TELEPHONE ENCOUNTER
Pharmacy requesting refill of Topamax.       Medication active on med list yes      Date of last fill: 9/20/21 for #180 and 3 refills  verified on 8/9/2022    verified by April Wray LPN      Date of last appointment: 12/06/2021    Next Visit Date: 12/12/2022

## 2022-08-12 RX ORDER — TOPIRAMATE 50 MG/1
TABLET, FILM COATED ORAL
Qty: 225 TABLET | Refills: 1 | Status: SHIPPED | OUTPATIENT
Start: 2022-08-12 | End: 2023-02-09

## 2022-12-12 ENCOUNTER — OFFICE VISIT (OUTPATIENT)
Dept: NEUROLOGY | Age: 67
End: 2022-12-12
Payer: MEDICARE

## 2022-12-12 VITALS
SYSTOLIC BLOOD PRESSURE: 121 MMHG | DIASTOLIC BLOOD PRESSURE: 66 MMHG | HEART RATE: 58 BPM | WEIGHT: 135 LBS | HEIGHT: 63 IN | BODY MASS INDEX: 23.92 KG/M2

## 2022-12-12 DIAGNOSIS — G43.109 BASILAR MIGRAINE: Primary | ICD-10-CM

## 2022-12-12 PROCEDURE — G8427 DOCREV CUR MEDS BY ELIG CLIN: HCPCS | Performed by: PSYCHIATRY & NEUROLOGY

## 2022-12-12 PROCEDURE — 99213 OFFICE O/P EST LOW 20 MIN: CPT | Performed by: PSYCHIATRY & NEUROLOGY

## 2022-12-12 PROCEDURE — 1036F TOBACCO NON-USER: CPT | Performed by: PSYCHIATRY & NEUROLOGY

## 2022-12-12 PROCEDURE — 1090F PRES/ABSN URINE INCON ASSESS: CPT | Performed by: PSYCHIATRY & NEUROLOGY

## 2022-12-12 PROCEDURE — G8420 CALC BMI NORM PARAMETERS: HCPCS | Performed by: PSYCHIATRY & NEUROLOGY

## 2022-12-12 PROCEDURE — G8484 FLU IMMUNIZE NO ADMIN: HCPCS | Performed by: PSYCHIATRY & NEUROLOGY

## 2022-12-12 PROCEDURE — 3017F COLORECTAL CA SCREEN DOC REV: CPT | Performed by: PSYCHIATRY & NEUROLOGY

## 2022-12-12 PROCEDURE — G8400 PT W/DXA NO RESULTS DOC: HCPCS | Performed by: PSYCHIATRY & NEUROLOGY

## 2022-12-12 PROCEDURE — 1123F ACP DISCUSS/DSCN MKR DOCD: CPT | Performed by: PSYCHIATRY & NEUROLOGY

## 2022-12-12 NOTE — PROGRESS NOTES
[facility], [address], [address] [place] [address]  Ph: [phone] or [phone]  FAX: [phone]    Chief Complaint: headaches     Dear Leigh Robles MD     I had the pleasure of seeing your patient today in neurology consultation for her symptoms. As you would recall Laron Henry is a 69 y.o. 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 Patient's MRI scan of the brain in 2012 showed periventricular white matter changes. The patient does not have any risk factor for small muscle disease specifically no diabetes, hypertension or hyperlipidemia, or tobacco use. Today, the patient reports continued benefit with topiramate and has been migraine-free since April 2020. She reports this migraine caused fatigue. She has had headaches 1-2 weeks, exacerbated by the weather change. She describes these as tension headaches. Imitrex resolves the headache, and is only require PRN. She will use Advil in place of Imitrex. She denies side effects with the medication including weakness, numbness or loss of vision, but does affirm some brain fog and word-finding difficulty after using the Topamax. Otherwise, the patient is satisfied with symptom control without lightheadedness. Reported success with Topamax 50 mg twice daily, but 25 mg twice daily was ineffective. She has begun working on puzzles and reading which is helping her forgetfulness. She admits to a headache following the second Covid-19 vaccine. She reports sleep disturbance and takes Benadryl intermittently. During visit on 9/20/2021, the patient reported experiencing a headache on Friday which lead to numbness in the right side of her head. Patient admits to having taking Imitrex with minimal benefit. She states headaches increased in frequency starting early this month. Patient has not noticed headaches are provoked with foods. Denies vision changes, pain in temples.  Admits to flashing in eyes which is not associated with recent increase in headache occurrence. Patient admits to Benadryl and melatonin use on a nightly basis to sleep better. Increased Topamax 50 mg BID to topiramate to 50 mg +75 mg a day. Patient was initiated on prednisone 20 mg, take 3 tab po qd x 3 days, 2 tab po qd x 3 days, 1 tab po qd x 3 days. Last visit 12/06/21, the patient admits to medication compliance with Topamax 50 mg +75 mg a day. Denies side effects of the medication. Patient reports she has experienced hardly any headaches since having Topamax dose increased. Patient reports she has recently started cutting out processed foods from her diet and feels this change, in addition to current medication, has helped control her headaches better. Patient reports dizziness has subsided for the most part but she continues to experience slight, intermittent dizziness. Of note, the patient's states that her daughter underwent PFO closure in November 2019. In office today, the patient reports that Topamax 50 mg + 75 mg has worked much better for her. She reports that she does experience breakthrough headaches and within the last month, she has woken up due to 2-3 very intense headaches. She attributes this to the weather as she is easily impacted by it, and also denies snoring and a history of sleep apnea. She denies experiencing fatigue and drowsiness throughout the day. She reports mild tingling in her upper extremities due to Topamax, and states that she will be having surgery in her right hand in January through Dr. Melissa Timmons as she has been experiencing problems with her joints. She reports neck pain and attributes it to needing a new pillow. This pain does not radiate to her shoulders, back or arms and she believes it is unrelated to the tingling within her hands.        Onset of headaches since age 8 years  Initial symptoms: Dizziness, near syncope   Abortive medications: Advil, Imitrex injections, Fiorinal 3  MRI brain January 2012 normal, MRA brain normal 11/2018    Past Medical History:   Diagnosis Date    Bursitis 2019    had injection of cortisone    Frozen shoulder     left    Insomnia     Migraine     Osteoarthritis     Osteopenia     Tension headache     Ulcerative colitis ([address]      Past Surgical History:   Procedure Laterality Date    TONSILLECTOMY      TUBAL LIGATION       Allergies   Allergen Reactions    Penicillins     Sulfa Antibiotics      Family History   Problem Relation Age of Onset    Cancer Mother         liver, colon    Diabetes Father     High Blood Pressure Father     Neuropathy Father     Heart Disease Father         CHF      Social History     Socioeconomic History    Marital status:      Spouse name: Not on file    Number of children: Not on file    Years of education: Not on file    Highest education level: Not on file   Occupational History    Not on file   Tobacco Use    Smoking status: Never    Smokeless tobacco: Never   Vaping Use    Vaping Use: Never used   Substance and Sexual Activity    Alcohol use: Yes     Alcohol/week: 1.0 standard drink     Types: 1 Glasses of wine per week     Comment: 1 a week    Drug use: No    Sexual activity: Not on file   Other Topics Concern    Not on file   Social History Narrative    Not on file     Social Determinants of Health     Financial Resource Strain: Not on file   Food Insecurity: Not on file   Transportation Needs: Not on file   Physical Activity: Not on file   Stress: Not on file   Social Connections: Not on file   Intimate Partner Violence: Not on file   Housing Stability: Not on file      There were no vitals taken for this visit.       HEENT [] Hearing Loss  [] Visual Disturbance  [] Tinnitus  [] Eye pain   Respiratory [] Shortness of Breath  [] Cough  [] Snoring   Cardiovascular [] Chest Pain  [] Palpitations  [] Lightheaded   GI [] Constipation  [] Diarrhea  [] Swallowing change  [] Nausea/vomiting    [] Urinary Frequency  [] Urinary Urgency   Musculoskeletal [] Neck pain  [] Back pain  [] Muscle pain  [] Restless legs   Dermatologic [] Skin changes   Neurologic [] Memory loss/confusion  [] Seizures  [] Trouble walking or imbalance  [x] Dizziness  [] Sleep disturbance  [] Weakness  [] Numbness  [] Tremors  [] Speech Difficulty  [x] Headaches  [] Light Sensitivity  [] Sound Sensitivity   Endocrinology []Excessive thirst  []Excessive hunger   Psychiatric [] Anxiety/Depression  [] Hallucination   Allergy/immunology []Hives/environmental allergies   Hematologic/lymph [] Abnormal bleeding  [] Abnormal bruising     General appearence: Normal. Well groomed.  In no acute distress    Head: Normocephalic, atraumatic  Eyes: Extraocular movements intact, eye lids normal  Lungs: Respirations unlabored, chest wall no deformity  ENT: Normal external ear canals, no sinus tenderness  Heart: Regular rate rhythm  Abdomen: No masses, tenderness  Extremities: No cyanosis or edema, 2+ pulses  Musculoskeletal: Normal range of motion in all joints  Skin: Intact, normal skin color    Neurological examination:    Mental status   Alert and oriented; intact memory with no confusion, speech or language problems; no hallucinations or delusions     Cranial nerves   II - visual fields intact to confrontation                                                III, IV, VI - extra-ocular muscles full: no pupillary defect; no JORGE, no nystagmus, no ptosis   V - normal facial sensation                                                               VII - normal facial symmetry                                                             VIII - intact hearing                                                                             IX, X - symmetrical palate                                                                  XI - symmetrical shoulder shrug                                                       XII - midline tongue without atrophy or fasciculation     Motor function  Normal muscle bulk and tone; normal power 5/5, including fine motor movements     Sensory function Intact to touch, pin, vibration, proprioception     Cerebellar Intact fine motor movement. No involuntary movements or tremors     Reflex function Intact 2+ DTR and symmetric. Negative Babinski     Gait                  Normal station and gait       No results found for: LDLCALC, LDLCHOLESTEROL, LDLDIRECT  No components found for: CHLPL  No results found for: TRIG  No results found for: HDL  No results found for: LDLCALC  No results found for: LABVLDL  No results found for: LABA1C  No results found for: EAG  No results found for: [id number]     Neurological work up:  CT head  CTA head and neck  MRI brain   2 D echo     All of patient's labs were personally reviewed. All the imaging studies were personally reviewed and discussed with the patient. Assessment Recommendations:  Basilar migraines  MRI scan of the brain in 2012 showed periventricular white matter changes    The patient reports relief and improvement of symptoms with topiramate 50 mg +75 mg a day. I recommend the patient continue current treatment plan for continued alleviation of symptoms. Follow up in 1 year or sooner if symptoms worsen. Leigh Robles MD I would like to thank you for the consult. Please do not hesitate if you have any questions about the patient care. Scribe Attestation:   By signing my name below, Claudine Jessika attest that this documentation has been prepared under the direction and in the presence of Fang Varela MD.    Electronically Signed: Melyssa Kapadia. 12/12/22. 9:57 AM    This note is created with the assistance of a speech-recognition program. While intending to generate a document that actually reflects the content of the visit, the document can still have some errors including those of syntax and sound a- like substitutions which may escape proofreading.   In such instances, actual meaning can be extrapolated by contextual derivation.

## 2022-12-22 RX ORDER — SUMATRIPTAN 50 MG/1
TABLET, FILM COATED ORAL
Qty: 9 TABLET | Refills: 2 | Status: SHIPPED | OUTPATIENT
Start: 2022-12-22

## 2022-12-22 NOTE — TELEPHONE ENCOUNTER
Naty Davis called in. She said she saw Dr. Meli Neff on 12/12/22 and he was going to refill her Sumatriptan but he didn't. She asked if we can please get it sent through for her today. I sent it through.

## 2023-02-02 DIAGNOSIS — G43.109 BASILAR MIGRAINE: Primary | ICD-10-CM

## 2023-02-02 DIAGNOSIS — G43.711 CHRONIC MIGRAINE WITHOUT AURA, WITH INTRACTABLE MIGRAINE, SO STATED, WITH STATUS MIGRAINOSUS: ICD-10-CM

## 2023-02-02 NOTE — TELEPHONE ENCOUNTER
Pharmacy requesting refill of Topamax 50mg.       Medication active on med list yes      Date of last fill: 08/12/2022    verified on 2/2/2023     verified by [employer] [place] LPN      Date of last appointment 12/12/2022    Next Visit Date:  Visit date not found

## 2023-02-03 RX ORDER — TOPIRAMATE 50 MG/1
TABLET, FILM COATED ORAL
Qty: 225 TABLET | Refills: 3 | Status: SHIPPED | OUTPATIENT
Start: 2023-02-03

## 2023-08-22 DIAGNOSIS — G43.711 CHRONIC MIGRAINE WITHOUT AURA, WITH INTRACTABLE MIGRAINE, SO STATED, WITH STATUS MIGRAINOSUS: ICD-10-CM

## 2023-08-22 DIAGNOSIS — G43.109 BASILAR MIGRAINE: Primary | ICD-10-CM

## 2023-08-22 NOTE — TELEPHONE ENCOUNTER
Nathaniel Ny called the office this morning. Patient stated that last Friday while standing at her kitchen counter she had an extreme sharp pain in her right temple. She described the pain as \"a firework going off in my head\". Patient stated that her vision then went black for a couple seconds and then she saw stars. This happened twice in a row. Patient stated that this has never happened previously. Since that time she has had a constant dull headache in the same area with intermittent pounding. There is photophobia and phonophobia. Patient denies any nausea or vomiting or other symptom. Nathaniel Ny stated that she continues to use Topamax 50 mg q am and 75 mg q hs without missing any doses. She has tried Advil and Imitrex with no benefit. Patient stated that before this her migraines were controlled. Patient denies any other illness and only admits to starting Lipitor 20 mg qd in July. Patient does admit that she hasn't felt well since the onset and is really tired. Writer advised that I would forward this information on to Dr. Sangita Frazier.

## 2023-08-23 RX ORDER — PREDNISONE 20 MG/1
TABLET ORAL
Qty: 18 TABLET | Refills: 0 | OUTPATIENT
Start: 2023-08-23

## 2023-08-23 NOTE — TELEPHONE ENCOUNTER
I would recommend to get MRI scan of the brain and MR angiogram head with and without contrast to rule out any structural abnormality. If patient is willing, in the meantime we can give prednisone taper starting Prednisone 60 mg a day, and then tapering it off by 20 mg every three days for total of 9 days.

## 2023-08-23 NOTE — TELEPHONE ENCOUNTER
Call placed to the patient and she was agreeable with the testing and steroid taper. Patient would like this sent to [facility] in [place]. Writer asked that she call if there were any problems. Patient verbally stated her understanding. Rx called in to pharmacist [facility] at [address] in [place], [place].

## 2023-09-18 ENCOUNTER — HOSPITAL ENCOUNTER (OUTPATIENT)
Dept: MRI IMAGING | Facility: CLINIC | Age: 68
Discharge: HOME | End: 2023-09-20
Attending: PSYCHIATRY & NEUROLOGY
Payer: MEDICARE

## 2023-09-18 DIAGNOSIS — G43.109 BASILAR MIGRAINE: ICD-10-CM

## 2023-09-18 DIAGNOSIS — G43.711 CHRONIC MIGRAINE WITHOUT AURA, WITH INTRACTABLE MIGRAINE, SO STATED, WITH STATUS MIGRAINOSUS: ICD-10-CM

## 2023-09-18 LAB — CREAT BLD-MCNC: 1.1 MG/DL (ref 0.6–1.4)

## 2023-09-18 PROCEDURE — 6360000004 HC RX CONTRAST MEDICATION: Performed by: PSYCHIATRY & NEUROLOGY

## 2023-09-18 PROCEDURE — A9579 GAD-BASE MR CONTRAST NOS,1ML: HCPCS | Performed by: PSYCHIATRY & NEUROLOGY

## 2023-09-18 PROCEDURE — 82565 ASSAY OF CREATININE: CPT

## 2023-09-18 PROCEDURE — 70546 MR ANGIOGRAPH HEAD W/O&W/DYE: CPT

## 2023-09-18 PROCEDURE — 70553 MRI BRAIN STEM W/O & W/DYE: CPT

## 2023-09-18 RX ADMIN — GADOTERIDOL 15 ML: 279.3 INJECTION, SOLUTION INTRAVENOUS at 10:14

## 2024-01-09 RX ORDER — SUMATRIPTAN 50 MG/1
TABLET, FILM COATED ORAL
Qty: 9 TABLET | Refills: 2 | OUTPATIENT
Start: 2024-01-09

## 2024-01-15 ENCOUNTER — TELEPHONE (OUTPATIENT)
Dept: NEUROLOGY | Age: 69
End: 2024-01-15

## 2024-01-15 RX ORDER — SUMATRIPTAN 50 MG/1
TABLET, FILM COATED ORAL
Qty: 9 TABLET | Refills: 2 | OUTPATIENT
Start: 2024-01-15

## 2024-01-16 RX ORDER — SUMATRIPTAN 50 MG/1
TABLET, FILM COATED ORAL
Qty: 9 TABLET | Refills: 0 | Status: SHIPPED | OUTPATIENT
Start: 2024-01-16

## 2024-01-29 DIAGNOSIS — G43.109 BASILAR MIGRAINE: ICD-10-CM

## 2024-01-29 DIAGNOSIS — G43.711 CHRONIC MIGRAINE WITHOUT AURA, WITH INTRACTABLE MIGRAINE, SO STATED, WITH STATUS MIGRAINOSUS: ICD-10-CM

## 2024-01-30 RX ORDER — TOPIRAMATE 50 MG/1
TABLET, FILM COATED ORAL
Qty: 225 TABLET | Refills: 3 | Status: SHIPPED | OUTPATIENT
Start: 2024-01-30

## 2024-01-30 NOTE — TELEPHONE ENCOUNTER
Pharmacy requesting refill of topiramate (TOPAMAX) 50 MG tablet      Medication active on med list yes      Date of last Rx: 2/3/2023 with 3 refills          verified by TAMEKA HELLER      Date of last appointment 12/18/2023    Next Visit Date:  4/17/2024

## 2024-02-16 ENCOUNTER — PATIENT MESSAGE (OUTPATIENT)
Dept: NEUROLOGY | Age: 69
End: 2024-02-16

## 2024-02-16 ENCOUNTER — TELEPHONE (OUTPATIENT)
Dept: NEUROLOGY | Age: 69
End: 2024-02-16

## 2024-02-19 RX ORDER — SUMATRIPTAN 50 MG/1
TABLET, FILM COATED ORAL
Qty: 9 TABLET | Refills: 5 | Status: SHIPPED | OUTPATIENT
Start: 2024-02-19

## 2024-02-19 NOTE — TELEPHONE ENCOUNTER
From: Veronica Lopez  To: Dr. Jeffrey Schneider  Sent: 2/16/2024 5:18 PM EST  Subject: Nuertec    Dr. Schneider,  Last week I checked again with my [facility] and they still had no answer as to coverage on this prescription. Today out of the blue my pt. D insurance company called me about something else. So I questioned them about the Nuertec. At this point it is cost prohibitive for me at $978.29 a mo.   I prefer to just stay with the sumatriptan. Which my pharmacy said is still available. There seems to have been a breakdown in communication between everybody. Since it has been almost 2 months since I was in your office. ( Dec. 18th). I know I am frustrated. I have checked with the pharmacy several times and now on Feb. 16th I am just finding out the cost and where I stand with this prescription. Not planning on using it!

## 2024-04-17 ENCOUNTER — OFFICE VISIT (OUTPATIENT)
Dept: NEUROLOGY | Age: 69
End: 2024-04-17

## 2024-04-17 VITALS
DIASTOLIC BLOOD PRESSURE: 67 MMHG | SYSTOLIC BLOOD PRESSURE: 107 MMHG | HEART RATE: 68 BPM | HEIGHT: 63 IN | WEIGHT: 136 LBS | BODY MASS INDEX: 24.1 KG/M2

## 2024-04-17 DIAGNOSIS — M54.81 BILATERAL OCCIPITAL NEURALGIA: Primary | ICD-10-CM

## 2024-04-17 RX ORDER — METHYLPREDNISOLONE SODIUM SUCCINATE 40 MG/ML
40 INJECTION, POWDER, LYOPHILIZED, FOR SOLUTION INTRAMUSCULAR; INTRAVENOUS ONCE
Status: COMPLETED | OUTPATIENT
Start: 2024-04-17 | End: 2024-04-17

## 2024-04-17 RX ADMIN — METHYLPREDNISOLONE SODIUM SUCCINATE 40 MG: 40 INJECTION, POWDER, LYOPHILIZED, FOR SOLUTION INTRAMUSCULAR; INTRAVENOUS at 13:19

## 2024-04-17 NOTE — PROGRESS NOTES
MD Jeffrey Bagley MD Sarah Mufti, MD Cheryl McGarry, CNP      Procedure Note    Procedure: Bilateral Greater and Lesser Occipital Nerve Block (CPT code 68670, 16524)    Indications:  Severe bilateral occipital neuralgia (ICD 10 M54.81)    Informed consent was obtained (explaining the procedure and risks and benefits) from patient. The signed consent form was placed in the medical record.    A time out was completed, verifying correct patient, procedure,site, positioning, and implants or special equipment.    Patient's bilateral occipital area was palpated to identify location of the greater and the lesser occipital nerve. Using a 10 ml syringe, 7 ml of 1% lidocaine (from a 20 ml bottle) and 1 ml (40 mg) of methylprednisolone was aspirated. Alcohol was applied topically to the skin. Using a 27 gauge needle (aspirating during insertion),  2 ml was injected on the greater and lesser occipital nerve on bilateral sides. Pressure with a gauze pad was held briefly upon the site of puncture to minimize bleeding and to further spread anaesthetic subcutaneously.  There were no complications.  Patient was comfortable and left without complaint.    Empty vial of methylprednisolone was discarded.    
9/19/2023: Unremarkable MRA of the brain.    MRI Brain W WO Contrast on 9/19/2023: Unremarkable pre and post-contrast MRI of the brain.    All medication side effects were discussed and questions answered.    Patient to follow up in 3-4 or sooner if symptoms worsen.    Shawn Benoit MD I would like to thank you for the consult. Please do not hesitate if you have any questions about the patient care.     Scribe Attestation:   By signing my name below, I, MARÍA PEREA, attest that this documentation has been prepared under the direction and in the presence of Jeffrey Schneider MD.    Electronically Signed: MARÍA PEREA. 04/17/24. 10:40 AM    I, JEFFREY SCHNEIDER MD, personally performed the services described in this documentation. All medical record entries made by the scribe were at my direction and in my presence. I have reviewed the chart and discharge instructions (if applicable) and agree that the record reflects my personal performance and is accurate and complete.    Electronically Signed: JEFFREY SCHNEIDER. 4/17/2024 11:11 AM    Diplomate, American Board of Psychiatry and Neurology  Diplomate, American Board of Clinical Neurophysiology  Diplomate, American Board of Epilepsy

## 2024-10-30 ENCOUNTER — TELEPHONE (OUTPATIENT)
Dept: NEUROLOGY | Age: 69
End: 2024-10-30

## 2024-10-30 ENCOUNTER — OFFICE VISIT (OUTPATIENT)
Dept: NEUROLOGY | Age: 69
End: 2024-10-30
Payer: MEDICARE

## 2024-10-30 VITALS
BODY MASS INDEX: 24.1 KG/M2 | HEIGHT: 63 IN | DIASTOLIC BLOOD PRESSURE: 71 MMHG | WEIGHT: 136 LBS | HEART RATE: 67 BPM | SYSTOLIC BLOOD PRESSURE: 113 MMHG

## 2024-10-30 DIAGNOSIS — M54.81 BILATERAL OCCIPITAL NEURALGIA: Primary | ICD-10-CM

## 2024-10-30 PROCEDURE — 99214 OFFICE O/P EST MOD 30 MIN: CPT | Performed by: PSYCHIATRY & NEUROLOGY

## 2024-10-30 PROCEDURE — 1159F MED LIST DOCD IN RCRD: CPT | Performed by: PSYCHIATRY & NEUROLOGY

## 2024-10-30 PROCEDURE — 1036F TOBACCO NON-USER: CPT | Performed by: PSYCHIATRY & NEUROLOGY

## 2024-10-30 PROCEDURE — G8420 CALC BMI NORM PARAMETERS: HCPCS | Performed by: PSYCHIATRY & NEUROLOGY

## 2024-10-30 PROCEDURE — 64450 NJX AA&/STRD OTHER PN/BRANCH: CPT | Performed by: PSYCHIATRY & NEUROLOGY

## 2024-10-30 PROCEDURE — 96372 THER/PROPH/DIAG INJ SC/IM: CPT | Performed by: PSYCHIATRY & NEUROLOGY

## 2024-10-30 PROCEDURE — 3017F COLORECTAL CA SCREEN DOC REV: CPT | Performed by: PSYCHIATRY & NEUROLOGY

## 2024-10-30 PROCEDURE — G8400 PT W/DXA NO RESULTS DOC: HCPCS | Performed by: PSYCHIATRY & NEUROLOGY

## 2024-10-30 PROCEDURE — G8427 DOCREV CUR MEDS BY ELIG CLIN: HCPCS | Performed by: PSYCHIATRY & NEUROLOGY

## 2024-10-30 PROCEDURE — 1123F ACP DISCUSS/DSCN MKR DOCD: CPT | Performed by: PSYCHIATRY & NEUROLOGY

## 2024-10-30 PROCEDURE — 1090F PRES/ABSN URINE INCON ASSESS: CPT | Performed by: PSYCHIATRY & NEUROLOGY

## 2024-10-30 PROCEDURE — 64405 NJX AA&/STRD GR OCPL NRV: CPT | Performed by: PSYCHIATRY & NEUROLOGY

## 2024-10-30 PROCEDURE — G8484 FLU IMMUNIZE NO ADMIN: HCPCS | Performed by: PSYCHIATRY & NEUROLOGY

## 2024-10-30 RX ORDER — METHYLPREDNISOLONE SODIUM SUCCINATE 40 MG/ML
40 INJECTION, POWDER, LYOPHILIZED, FOR SOLUTION INTRAMUSCULAR; INTRAVENOUS ONCE
Status: COMPLETED | OUTPATIENT
Start: 2024-10-30 | End: 2024-10-30

## 2024-10-30 RX ADMIN — METHYLPREDNISOLONE SODIUM SUCCINATE 40 MG: 40 INJECTION, POWDER, LYOPHILIZED, FOR SOLUTION INTRAMUSCULAR; INTRAVENOUS at 11:08

## 2024-10-30 NOTE — PROGRESS NOTES
[facility] [employer]  [address], [address]  [place] [address]  Ph: [phone] or [phone]  FAX: [phone]      Reason for consult: Chronic Migraine  I had the pleasure of seeing your patient in neurology consultation for her symptoms. As you would recall, Veronica Lopez is a 69-year-old female here today for evaluation and management of chronic migraine, which she has experienced since childhood. Ms. Lopez reports her migraines occur approximately 10 times per month, often exacerbated by weather changes. Her headaches have presented with intermittent auras, including transient facial numbness and vision changes. She describes some of her migraines as tension-like in quality and affecting the bioccipital region.  Since her last visit in April 2024, Ms. Lopez's migraines remain relatively controlled with Topamax 50 mg + 75 mg daily. This medication has provided meaningful prophylaxis, and she reports being mostly free of side effects except for occasional, mild dizziness and brain fog. However, she has continued to experience breakthrough migraines several times per month and recently reported worsening tenderness in the occipital region. She attributes some symptoms to weather patterns, which she finds challenging to control. She previously trialed Nurtec as an abortive treatment but could not continue due to insurance issues. Currently, she uses Imitrex or ibuprofen as abortive therapy, with inconsistent results. She has noticed significant relief from prior occipital nerve block injections, which reduced her headache frequency and severity for several months; thus, a repeat injection was scheduled today pending insurance approval.      Past Medical History:   Diagnosis Date    Bursitis 2019    had injection of cortisone    Frozen shoulder     left    Insomnia     Migraine     Osteoarthritis     Osteopenia     Tension headache     Ulcerative colitis (HCC)      Past Surgical

## 2024-10-30 NOTE — PROGRESS NOTES
MD Jeffrey Bagley MD Sarah Mufti, MD Cheryl McGarry, CNP      Procedure Note    Procedure: Bilateral Greater and Lesser Occipital Nerve Block (CPT code 92421, 82470)    Indications:  Severe bilateral occipital neuralgia (ICD 10 M54.81)    Informed consent was obtained (explaining the procedure and risks and benefits) from patient. The signed consent form was placed in the medical record.    A time out was completed, verifying correct patient, procedure,site, positioning, and implants or special equipment.    Patient's bilateral occipital area was palpated to identify location of the greater and the lesser occipital nerve. Using a 10 ml syringe, 7 ml of 1% lidocaine (from a 20 ml bottle) and 1 ml (40 mg) of methylprednisolone was aspirated. Alcohol was applied topically to the skin. Using a 27 gauge needle (aspirating during insertion),  2 ml was injected on the greater and lesser occipital nerve on bilateral sides. Pressure with a gauze pad was held briefly upon the site of puncture to minimize bleeding and to further spread anaesthetic subcutaneously.  There were no complications.  Patient was comfortable and left without complaint.    Empty vial of methylprednisolone was discarded.

## 2025-01-21 DIAGNOSIS — G43.711 CHRONIC MIGRAINE WITHOUT AURA, WITH INTRACTABLE MIGRAINE, SO STATED, WITH STATUS MIGRAINOSUS: ICD-10-CM

## 2025-01-21 DIAGNOSIS — G43.109 BASILAR MIGRAINE: ICD-10-CM

## 2025-01-21 RX ORDER — TOPIRAMATE 50 MG/1
TABLET, FILM COATED ORAL
Qty: 225 TABLET | Refills: 1 | Status: SHIPPED | OUTPATIENT
Start: 2025-01-21

## 2025-01-21 NOTE — TELEPHONE ENCOUNTER
Pharmacy requesting refill of topiramate 50mg.      Medication active on med list yes      Date of last Rx: 1/30/2024 with 3 refills          verified by GASTON TAPIA      Date of last appointment 10/30/2024    Next Visit Date:  Visit date not found

## 2025-05-13 ENCOUNTER — OFFICE VISIT (OUTPATIENT)
Dept: NEUROLOGY | Age: 70
End: 2025-05-13

## 2025-05-13 VITALS
SYSTOLIC BLOOD PRESSURE: 111 MMHG | HEART RATE: 67 BPM | BODY MASS INDEX: 23.78 KG/M2 | DIASTOLIC BLOOD PRESSURE: 67 MMHG | HEIGHT: 63 IN | WEIGHT: 134.2 LBS

## 2025-05-13 DIAGNOSIS — G43.711 CHRONIC MIGRAINE WITHOUT AURA, WITH INTRACTABLE MIGRAINE, SO STATED, WITH STATUS MIGRAINOSUS: ICD-10-CM

## 2025-05-13 DIAGNOSIS — G43.109 BASILAR MIGRAINE: ICD-10-CM

## 2025-05-13 DIAGNOSIS — M54.81 BILATERAL OCCIPITAL NEURALGIA: Primary | ICD-10-CM

## 2025-05-13 RX ORDER — VALACYCLOVIR HYDROCHLORIDE 1 G/1
TABLET, FILM COATED ORAL
COMMUNITY
Start: 2025-02-27

## 2025-05-13 RX ORDER — METHYLPREDNISOLONE SODIUM SUCCINATE 40 MG/ML
40 INJECTION INTRAMUSCULAR; INTRAVENOUS ONCE
Status: COMPLETED | OUTPATIENT
Start: 2025-05-13 | End: 2025-05-13

## 2025-05-13 RX ORDER — ZINC GLUCONATE 50 MG
50 TABLET ORAL DAILY
COMMUNITY

## 2025-05-13 RX ORDER — TOPIRAMATE 50 MG/1
75 TABLET, FILM COATED ORAL 2 TIMES DAILY
Qty: 270 TABLET | Refills: 3 | Status: SHIPPED | OUTPATIENT
Start: 2025-05-13 | End: 2025-05-13

## 2025-05-13 RX ORDER — TOPIRAMATE 50 MG/1
75 TABLET, FILM COATED ORAL 2 TIMES DAILY
Qty: 270 TABLET | Refills: 3 | Status: SHIPPED | OUTPATIENT
Start: 2025-05-13 | End: 2025-08-11

## 2025-05-13 RX ADMIN — METHYLPREDNISOLONE SODIUM SUCCINATE 40 MG: 40 INJECTION INTRAMUSCULAR; INTRAVENOUS at 16:43

## 2025-05-15 NOTE — PROGRESS NOTES
MD Jeffrey Bagley MD Sarah Mufti, MD Cheryl McGarry, CNP      Procedure Note    Procedure: Bilateral Greater and Lesser Occipital Nerve Block (CPT code 94775, 36272)    Indications:  Severe bilateral occipital neuralgia (ICD 10 M54.81)    Informed consent was obtained (explaining the procedure and risks and benefits) from patient. The signed consent form was placed in the medical record.    A time out was completed, verifying correct patient, procedure,site, positioning, and implants or special equipment.    Patient's bilateral occipital area was palpated to identify location of the greater and the lesser occipital nerve. Using a 10 ml syringe, 7 ml of 1% lidocaine (from a 20 ml bottle) and 1 ml (40 mg) of methylprednisolone was aspirated. Alcohol was applied topically to the skin. Using a 27 gauge needle (aspirating during insertion),  2 ml was injected on the greater and lesser occipital nerve on bilateral sides. Pressure with a gauze pad was held briefly upon the site of puncture to minimize bleeding and to further spread anaesthetic subcutaneously.  There were no complications.  Patient was comfortable and left without complaint.    Empty vial of methylprednisolone was discarded.    
Snoring   Cardiovascular [] Chest Pain  [] Palpitations  [] Lightheaded   GI [] Constipation  [] Diarrhea  [] Swallowing change  [] Nausea/vomiting    [] Urinary Frequency  [] Urinary Urgency   Musculoskeletal [] Neck pain  [] Back pain  [] Muscle pain  [] Restless legs   Dermatologic [] Skin changes   Neurologic [] Memory loss/confusion  [] Seizures  [] Trouble walking or imbalance  [] Dizziness  [] Sleep disturbance  [] Weakness  [] Numbness  [] Tremors  [] Speech Difficulty  [x] Headaches  [x] Light Sensitivity  [x] Sound Sensitivity   Endocrinology []Excessive thirst  []Excessive hunger   Psychiatric [] Anxiety/Depression  [] Hallucination   Allergy/immunology []Hives/environmental allergies   Hematologic/lymph [] Abnormal bleeding  [] Abnormal bruising       Neurological Examination:  Domain Findings   Mental status Alert and oriented; intact memory; no confusion, speech, or language problems; no hallucinations or delusions   Cranial nerves II - visual fields intact to confrontation; III, IV, VI - full extra-ocular movements, no pupillary defects, JORGE, nystagmus, or ptosis; V - normal facial sensation; VII - symmetric facial movements; VIII - intact hearing; IX, X - symmetric palate elevation; XI - symmetric shoulder shrug; XII - midline tongue without atrophy or fasciculation   Motor function Normal bulk and tone; strength 5/5 throughout, including fine motor tasks   Sensory function Intact to light touch, pinprick, vibration, and proprioception   Cerebellar Intact fine motor coordination; no tremors or involuntary movements   Reflex function Symmetric 2+ deep tendon reflexes; plantar responses flexor (negative Babinski)   Gait Normal station and gait without imbalance   Neurological Work-up:  Date Study Finding   09/19/2023 MRI Brain w/wo Contrast No acute intracranial abnormality, no aneurysm   09/19/2023 MRA Head w/wo Contrast No significant vascular pathology   11/26/2018 MRI Brain wo Contrast No acute

## 2025-08-14 ENCOUNTER — OFFICE VISIT (OUTPATIENT)
Dept: NEUROLOGY | Age: 70
End: 2025-08-14
Payer: MEDICARE

## 2025-08-14 VITALS
WEIGHT: 131.4 LBS | DIASTOLIC BLOOD PRESSURE: 76 MMHG | HEART RATE: 58 BPM | BODY MASS INDEX: 23.28 KG/M2 | SYSTOLIC BLOOD PRESSURE: 119 MMHG | HEIGHT: 63 IN

## 2025-08-14 DIAGNOSIS — G43.109 BASILAR MIGRAINE: Primary | ICD-10-CM

## 2025-08-14 DIAGNOSIS — M54.81 BILATERAL OCCIPITAL NEURALGIA: ICD-10-CM

## 2025-08-14 PROCEDURE — 1159F MED LIST DOCD IN RCRD: CPT | Performed by: PSYCHIATRY & NEUROLOGY

## 2025-08-14 PROCEDURE — 3017F COLORECTAL CA SCREEN DOC REV: CPT | Performed by: PSYCHIATRY & NEUROLOGY

## 2025-08-14 PROCEDURE — G8400 PT W/DXA NO RESULTS DOC: HCPCS | Performed by: PSYCHIATRY & NEUROLOGY

## 2025-08-14 PROCEDURE — G8420 CALC BMI NORM PARAMETERS: HCPCS | Performed by: PSYCHIATRY & NEUROLOGY

## 2025-08-14 PROCEDURE — G8427 DOCREV CUR MEDS BY ELIG CLIN: HCPCS | Performed by: PSYCHIATRY & NEUROLOGY

## 2025-08-14 PROCEDURE — 1123F ACP DISCUSS/DSCN MKR DOCD: CPT | Performed by: PSYCHIATRY & NEUROLOGY

## 2025-08-14 PROCEDURE — 1090F PRES/ABSN URINE INCON ASSESS: CPT | Performed by: PSYCHIATRY & NEUROLOGY

## 2025-08-14 PROCEDURE — 1036F TOBACCO NON-USER: CPT | Performed by: PSYCHIATRY & NEUROLOGY

## 2025-08-14 PROCEDURE — 99214 OFFICE O/P EST MOD 30 MIN: CPT | Performed by: PSYCHIATRY & NEUROLOGY

## 2025-08-14 RX ORDER — TRIAMCINOLONE ACETONIDE 5 MG/G
1 CREAM TOPICAL 2 TIMES DAILY
COMMUNITY
Start: 2025-08-12